# Patient Record
Sex: MALE | Race: WHITE | NOT HISPANIC OR LATINO | Employment: OTHER | URBAN - NONMETROPOLITAN AREA
[De-identification: names, ages, dates, MRNs, and addresses within clinical notes are randomized per-mention and may not be internally consistent; named-entity substitution may affect disease eponyms.]

---

## 2017-10-30 ENCOUNTER — HOSPITAL ENCOUNTER (OUTPATIENT)
Dept: GENERAL RADIOLOGY | Facility: HOSPITAL | Age: 52
Discharge: HOME | End: 2017-10-30
Attending: INTERNAL MEDICINE | Admitting: INTERNAL MEDICINE

## 2017-10-30 ENCOUNTER — TRANSCRIBE ORDERS (OUTPATIENT)
Dept: GENERAL RADIOLOGY | Facility: HOSPITAL | Age: 52
End: 2017-10-30

## 2017-10-30 DIAGNOSIS — M54.2 CERVICALGIA: ICD-10-CM

## 2017-10-30 DIAGNOSIS — M54.2 CERVICALGIA: Primary | ICD-10-CM

## 2017-10-30 PROCEDURE — 72050 X-RAY EXAM NECK SPINE 4/5VWS: CPT

## 2017-10-30 PROCEDURE — 72052 X-RAY EXAM NECK SPINE 6/>VWS: CPT | Performed by: RADIOLOGY

## 2017-11-20 ENCOUNTER — TRANSCRIBE ORDERS (OUTPATIENT)
Dept: ADMINISTRATIVE | Facility: HOSPITAL | Age: 52
End: 2017-11-20

## 2017-11-20 DIAGNOSIS — M50.90 DISC DISORDER OF CERVICAL REGION: Primary | ICD-10-CM

## 2017-12-04 ENCOUNTER — HOSPITAL ENCOUNTER (OUTPATIENT)
Dept: MRI IMAGING | Facility: HOSPITAL | Age: 52
Discharge: HOME | End: 2017-12-04
Attending: INTERNAL MEDICINE | Admitting: INTERNAL MEDICINE

## 2017-12-04 DIAGNOSIS — M50.90 DISC DISORDER OF CERVICAL REGION: ICD-10-CM

## 2017-12-04 PROCEDURE — 72141 MRI NECK SPINE W/O DYE: CPT

## 2017-12-04 PROCEDURE — 72141 MRI NECK SPINE W/O DYE: CPT | Performed by: RADIOLOGY

## 2018-12-11 ENCOUNTER — TRANSCRIBE ORDERS (OUTPATIENT)
Dept: ADMINISTRATIVE | Facility: HOSPITAL | Age: 53
End: 2018-12-11

## 2018-12-11 DIAGNOSIS — M50.20 DISPLACEMENT OF CERVICAL INTERVERTEBRAL DISC WITHOUT MYELOPATHY: Primary | ICD-10-CM

## 2018-12-17 ENCOUNTER — HOSPITAL ENCOUNTER (OUTPATIENT)
Dept: GENERAL RADIOLOGY | Facility: HOSPITAL | Age: 53
Discharge: HOME | End: 2018-12-17
Attending: INTERNAL MEDICINE

## 2018-12-17 ENCOUNTER — TRANSCRIBE ORDERS (OUTPATIENT)
Dept: ADMINISTRATIVE | Facility: HOSPITAL | Age: 53
End: 2018-12-17

## 2018-12-17 ENCOUNTER — HOSPITAL ENCOUNTER (OUTPATIENT)
Dept: MRI IMAGING | Facility: HOSPITAL | Age: 53
Discharge: HOME | End: 2018-12-17
Attending: INTERNAL MEDICINE | Admitting: INTERNAL MEDICINE

## 2018-12-17 DIAGNOSIS — M50.20 DISPLACEMENT OF CERVICAL INTERVERTEBRAL DISC WITHOUT MYELOPATHY: ICD-10-CM

## 2018-12-17 DIAGNOSIS — M54.9 BACK PAIN, UNSPECIFIED BACK LOCATION, UNSPECIFIED BACK PAIN LATERALITY, UNSPECIFIED CHRONICITY: ICD-10-CM

## 2018-12-17 DIAGNOSIS — M54.9 BACK PAIN, UNSPECIFIED BACK LOCATION, UNSPECIFIED BACK PAIN LATERALITY, UNSPECIFIED CHRONICITY: Primary | ICD-10-CM

## 2018-12-17 PROCEDURE — 72072 X-RAY EXAM THORAC SPINE 3VWS: CPT | Performed by: RADIOLOGY

## 2018-12-17 PROCEDURE — 72072 X-RAY EXAM THORAC SPINE 3VWS: CPT

## 2018-12-17 PROCEDURE — 72141 MRI NECK SPINE W/O DYE: CPT

## 2018-12-17 PROCEDURE — 72141 MRI NECK SPINE W/O DYE: CPT | Performed by: RADIOLOGY

## 2019-11-06 ENCOUNTER — TRANSCRIBE ORDERS (OUTPATIENT)
Dept: ADMINISTRATIVE | Facility: HOSPITAL | Age: 54
End: 2019-11-06

## 2019-11-06 DIAGNOSIS — R07.89 OTHER CHEST PAIN: Primary | ICD-10-CM

## 2019-11-11 ENCOUNTER — HOSPITAL ENCOUNTER (OUTPATIENT)
Dept: GENERAL RADIOLOGY | Facility: HOSPITAL | Age: 54
Discharge: HOME | End: 2019-11-11
Admitting: INTERNAL MEDICINE

## 2019-11-11 ENCOUNTER — TRANSCRIBE ORDERS (OUTPATIENT)
Dept: OTHER | Facility: OTHER | Age: 54
End: 2019-11-11

## 2019-11-11 DIAGNOSIS — R06.02 SHORTNESS OF BREATH: Primary | ICD-10-CM

## 2019-11-11 DIAGNOSIS — R06.02 SHORTNESS OF BREATH: ICD-10-CM

## 2019-11-11 PROCEDURE — 71046 X-RAY EXAM CHEST 2 VIEWS: CPT

## 2019-11-11 PROCEDURE — 71046 X-RAY EXAM CHEST 2 VIEWS: CPT | Performed by: RADIOLOGY

## 2019-11-18 ENCOUNTER — HOSPITAL ENCOUNTER (OUTPATIENT)
Dept: NUCLEAR MEDICINE | Facility: HOSPITAL | Age: 54
Discharge: HOME | End: 2019-11-18

## 2019-11-18 ENCOUNTER — HOSPITAL ENCOUNTER (OUTPATIENT)
Dept: CARDIOLOGY | Facility: HOSPITAL | Age: 54
Discharge: HOME | End: 2019-11-18

## 2019-11-18 DIAGNOSIS — R07.89 OTHER CHEST PAIN: ICD-10-CM

## 2019-11-18 PROCEDURE — A9500 TC99M SESTAMIBI: HCPCS | Performed by: INTERNAL MEDICINE

## 2019-11-18 PROCEDURE — 78452 HT MUSCLE IMAGE SPECT MULT: CPT

## 2019-11-18 PROCEDURE — 0 TECHNETIUM SESTAMIBI: Performed by: INTERNAL MEDICINE

## 2019-11-18 PROCEDURE — 93017 CV STRESS TEST TRACING ONLY: CPT

## 2019-11-18 RX ADMIN — TECHNETIUM TC 99M SESTAMIBI 1 DOSE: 1 INJECTION INTRAVENOUS at 12:00

## 2019-11-18 RX ADMIN — TECHNETIUM TC 99M SESTAMIBI 1 DOSE: 1 INJECTION INTRAVENOUS at 13:26

## 2019-11-20 LAB
BH CV NUCLEAR PRIOR STUDY: 3
BH CV STRESS BP STAGE 1: NORMAL
BH CV STRESS BP STAGE 2: NORMAL
BH CV STRESS BP STAGE 3: NORMAL
BH CV STRESS BP STAGE 4: NORMAL
BH CV STRESS DURATION MIN STAGE 1: 3
BH CV STRESS DURATION MIN STAGE 2: 3
BH CV STRESS DURATION MIN STAGE 3: 3
BH CV STRESS DURATION MIN STAGE 4: 3
BH CV STRESS DURATION SEC STAGE 1: 0
BH CV STRESS DURATION SEC STAGE 2: 0
BH CV STRESS DURATION SEC STAGE 3: 0
BH CV STRESS DURATION SEC STAGE 4: 0
BH CV STRESS GRADE STAGE 1: 10
BH CV STRESS GRADE STAGE 2: 12
BH CV STRESS GRADE STAGE 3: 14
BH CV STRESS GRADE STAGE 4: 16
BH CV STRESS HR STAGE 1: 98
BH CV STRESS HR STAGE 2: 114
BH CV STRESS HR STAGE 3: 137
BH CV STRESS HR STAGE 4: 152
BH CV STRESS METS STAGE 1: 5
BH CV STRESS METS STAGE 2: 7.5
BH CV STRESS METS STAGE 3: 10
BH CV STRESS METS STAGE 4: 13.5
BH CV STRESS PROTOCOL 1: NORMAL
BH CV STRESS RECOVERY BP: NORMAL MMHG
BH CV STRESS RECOVERY HR: 94 BPM
BH CV STRESS SPEED STAGE 1: 1.7
BH CV STRESS SPEED STAGE 2: 2.5
BH CV STRESS SPEED STAGE 3: 3.4
BH CV STRESS SPEED STAGE 4: 4.2
BH CV STRESS STAGE 1: 1
BH CV STRESS STAGE 2: 2
BH CV STRESS STAGE 3: 3
BH CV STRESS STAGE 4: 4
LV EF NUC BP: 51 %
MAXIMAL PREDICTED HEART RATE: 166 BPM
PERCENT MAX PREDICTED HR: 91.57 %
STRESS BASELINE BP: NORMAL MMHG
STRESS BASELINE HR: 90 BPM
STRESS PERCENT HR: 108 %
STRESS POST ESTIMATED WORKLOAD: 12.8 METS
STRESS POST EXERCISE DUR MIN: 10 MIN
STRESS POST EXERCISE DUR SEC: 0 SEC
STRESS POST PEAK BP: NORMAL MMHG
STRESS POST PEAK HR: 152 BPM
STRESS TARGET HR: 141 BPM

## 2019-12-05 ENCOUNTER — TRANSCRIBE ORDERS (OUTPATIENT)
Dept: ADMINISTRATIVE | Facility: HOSPITAL | Age: 54
End: 2019-12-05

## 2019-12-05 DIAGNOSIS — R06.02 SHORTNESS OF BREATH: Primary | ICD-10-CM

## 2019-12-16 ENCOUNTER — APPOINTMENT (OUTPATIENT)
Dept: RESPIRATORY THERAPY | Facility: HOSPITAL | Age: 54
End: 2019-12-16

## 2020-05-11 ENCOUNTER — TRANSCRIBE ORDERS (OUTPATIENT)
Dept: ADMINISTRATIVE | Facility: HOSPITAL | Age: 55
End: 2020-05-11

## 2020-05-11 ENCOUNTER — HOSPITAL ENCOUNTER (OUTPATIENT)
Dept: GENERAL RADIOLOGY | Facility: HOSPITAL | Age: 55
Discharge: HOME | End: 2020-05-11

## 2020-05-11 ENCOUNTER — APPOINTMENT (OUTPATIENT)
Dept: GENERAL RADIOLOGY | Facility: HOSPITAL | Age: 55
End: 2020-05-11

## 2020-05-11 ENCOUNTER — HOSPITAL ENCOUNTER (OUTPATIENT)
Dept: GENERAL RADIOLOGY | Facility: HOSPITAL | Age: 55
Discharge: HOME | End: 2020-05-11
Admitting: INTERNAL MEDICINE

## 2020-05-11 DIAGNOSIS — R07.81 RIB PAIN: Primary | ICD-10-CM

## 2020-05-11 DIAGNOSIS — M54.14 THORACIC NEURITIS: ICD-10-CM

## 2020-05-11 DIAGNOSIS — R07.81 PLEURITIC CHEST PAIN: ICD-10-CM

## 2020-05-11 DIAGNOSIS — M54.14 THORACIC NEURITIS: Primary | ICD-10-CM

## 2020-05-11 PROCEDURE — 71111 X-RAY EXAM RIBS/CHEST4/> VWS: CPT | Performed by: RADIOLOGY

## 2020-05-11 PROCEDURE — 73030 X-RAY EXAM OF SHOULDER: CPT

## 2020-05-11 PROCEDURE — 72072 X-RAY EXAM THORAC SPINE 3VWS: CPT

## 2020-05-11 PROCEDURE — 72072 X-RAY EXAM THORAC SPINE 3VWS: CPT | Performed by: RADIOLOGY

## 2020-05-11 PROCEDURE — 73030 X-RAY EXAM OF SHOULDER: CPT | Performed by: RADIOLOGY

## 2020-05-11 PROCEDURE — 71110 X-RAY EXAM RIBS BIL 3 VIEWS: CPT

## 2020-05-12 ENCOUNTER — TRANSCRIBE ORDERS (OUTPATIENT)
Dept: ADMINISTRATIVE | Facility: HOSPITAL | Age: 55
End: 2020-05-12

## 2020-05-12 DIAGNOSIS — M75.101 TEAR OF RIGHT ROTATOR CUFF, UNSPECIFIED TEAR EXTENT, UNSPECIFIED WHETHER TRAUMATIC: Primary | ICD-10-CM

## 2020-05-12 DIAGNOSIS — M89.8X9 BONE PAIN: ICD-10-CM

## 2020-05-22 ENCOUNTER — APPOINTMENT (OUTPATIENT)
Dept: NUCLEAR MEDICINE | Facility: HOSPITAL | Age: 55
End: 2020-05-22

## 2020-05-22 ENCOUNTER — HOSPITAL ENCOUNTER (OUTPATIENT)
Dept: MRI IMAGING | Facility: HOSPITAL | Age: 55
Discharge: HOME | End: 2020-05-22

## 2020-05-22 DIAGNOSIS — M75.101 TEAR OF RIGHT ROTATOR CUFF, UNSPECIFIED TEAR EXTENT, UNSPECIFIED WHETHER TRAUMATIC: ICD-10-CM

## 2020-05-22 PROCEDURE — 73221 MRI JOINT UPR EXTREM W/O DYE: CPT

## 2020-05-22 PROCEDURE — A9577 INJ MULTIHANCE: HCPCS

## 2020-05-22 PROCEDURE — 73221 MRI JOINT UPR EXTREM W/O DYE: CPT | Performed by: RADIOLOGY

## 2020-05-22 PROCEDURE — 0 GADOBENATE DIMEGLUMINE 529 MG/ML SOLUTION

## 2020-11-30 ENCOUNTER — APPOINTMENT (OUTPATIENT)
Dept: NUCLEAR MEDICINE | Facility: HOSPITAL | Age: 55
End: 2020-11-30

## 2020-11-30 ENCOUNTER — APPOINTMENT (OUTPATIENT)
Dept: BONE DENSITY | Facility: HOSPITAL | Age: 55
End: 2020-11-30

## 2020-12-07 ENCOUNTER — HOSPITAL ENCOUNTER (OUTPATIENT)
Dept: BONE DENSITY | Facility: HOSPITAL | Age: 55
Discharge: HOME | End: 2020-12-07
Admitting: INTERNAL MEDICINE

## 2020-12-07 DIAGNOSIS — M81.0 AGE-RELATED OSTEOPOROSIS WITHOUT CURRENT PATHOLOGICAL FRACTURE: ICD-10-CM

## 2020-12-07 PROCEDURE — 77080 DXA BONE DENSITY AXIAL: CPT | Performed by: RADIOLOGY

## 2020-12-07 PROCEDURE — 77080 DXA BONE DENSITY AXIAL: CPT

## 2021-03-01 ENCOUNTER — IMMUNIZATION (OUTPATIENT)
Dept: VACCINE CLINIC | Facility: HOSPITAL | Age: 56
End: 2021-03-01

## 2021-03-01 PROCEDURE — 91300 HC SARSCOV02 VAC 30MCG/0.3ML IM: CPT | Performed by: INTERNAL MEDICINE

## 2021-03-01 PROCEDURE — 0001A: CPT | Performed by: INTERNAL MEDICINE

## 2021-03-22 ENCOUNTER — IMMUNIZATION (OUTPATIENT)
Dept: VACCINE CLINIC | Facility: HOSPITAL | Age: 56
End: 2021-03-22

## 2021-03-22 PROCEDURE — 91300 HC SARSCOV02 VAC 30MCG/0.3ML IM: CPT | Performed by: INTERNAL MEDICINE

## 2021-03-22 PROCEDURE — 0002A: CPT | Performed by: INTERNAL MEDICINE

## 2021-07-09 ENCOUNTER — TRANSCRIBE ORDERS (OUTPATIENT)
Dept: ADMINISTRATIVE | Facility: HOSPITAL | Age: 56
End: 2021-07-09

## 2021-07-09 ENCOUNTER — HOSPITAL ENCOUNTER (OUTPATIENT)
Dept: GENERAL RADIOLOGY | Facility: HOSPITAL | Age: 56
Discharge: HOME | End: 2021-07-09
Admitting: INTERNAL MEDICINE

## 2021-07-09 DIAGNOSIS — S46.912A STRAIN OF SHOULDER, LEFT, INITIAL ENCOUNTER: ICD-10-CM

## 2021-07-09 DIAGNOSIS — S46.912A STRAIN OF SHOULDER, LEFT, INITIAL ENCOUNTER: Primary | ICD-10-CM

## 2021-07-09 PROCEDURE — 73030 X-RAY EXAM OF SHOULDER: CPT

## 2021-07-09 PROCEDURE — 73030 X-RAY EXAM OF SHOULDER: CPT | Performed by: RADIOLOGY

## 2021-07-14 ENCOUNTER — TRANSCRIBE ORDERS (OUTPATIENT)
Dept: ADMINISTRATIVE | Facility: HOSPITAL | Age: 56
End: 2021-07-14

## 2021-07-14 DIAGNOSIS — M25.512 LEFT SHOULDER PAIN, UNSPECIFIED CHRONICITY: Primary | ICD-10-CM

## 2021-08-02 ENCOUNTER — HOSPITAL ENCOUNTER (OUTPATIENT)
Dept: MRI IMAGING | Facility: HOSPITAL | Age: 56
Discharge: HOME | End: 2021-08-02
Admitting: INTERNAL MEDICINE

## 2021-08-02 DIAGNOSIS — M25.512 LEFT SHOULDER PAIN, UNSPECIFIED CHRONICITY: ICD-10-CM

## 2021-08-02 PROCEDURE — 73221 MRI JOINT UPR EXTREM W/O DYE: CPT | Performed by: RADIOLOGY

## 2021-08-02 PROCEDURE — 73221 MRI JOINT UPR EXTREM W/O DYE: CPT

## 2021-08-03 ENCOUNTER — HOSPITAL ENCOUNTER (OUTPATIENT)
Dept: MRI IMAGING | Facility: HOSPITAL | Age: 56
Discharge: HOME | End: 2021-08-03
Admitting: INTERNAL MEDICINE

## 2021-08-03 ENCOUNTER — TRANSCRIBE ORDERS (OUTPATIENT)
Dept: ADMINISTRATIVE | Facility: HOSPITAL | Age: 56
End: 2021-08-03

## 2021-08-03 DIAGNOSIS — M50.90 DISC DISORDER OF CERVICAL REGION: ICD-10-CM

## 2021-08-03 DIAGNOSIS — M50.20 DISPLACEMENT OF CERVICAL INTERVERTEBRAL DISC WITHOUT MYELOPATHY: ICD-10-CM

## 2021-08-03 DIAGNOSIS — S43.431A TEAR OF RIGHT GLENOID LABRUM, INITIAL ENCOUNTER: Primary | ICD-10-CM

## 2021-08-03 DIAGNOSIS — S43.431A TEAR OF RIGHT GLENOID LABRUM, INITIAL ENCOUNTER: ICD-10-CM

## 2021-08-03 PROCEDURE — 72141 MRI NECK SPINE W/O DYE: CPT | Performed by: RADIOLOGY

## 2021-08-03 PROCEDURE — 72141 MRI NECK SPINE W/O DYE: CPT

## 2021-09-02 ENCOUNTER — OFFICE VISIT (OUTPATIENT)
Dept: NEUROSURGERY | Facility: CLINIC | Age: 56
End: 2021-09-02

## 2021-09-02 VITALS
BODY MASS INDEX: 26.48 KG/M2 | TEMPERATURE: 98.2 F | WEIGHT: 199.8 LBS | HEIGHT: 73 IN | OXYGEN SATURATION: 100 % | HEART RATE: 77 BPM

## 2021-09-02 DIAGNOSIS — M50.20 RUPTURED DISC, CERVICAL: ICD-10-CM

## 2021-09-02 DIAGNOSIS — M47.812 ARTHRITIS OF NECK: Primary | ICD-10-CM

## 2021-09-02 PROCEDURE — 99204 OFFICE O/P NEW MOD 45 MIN: CPT | Performed by: NEUROLOGICAL SURGERY

## 2021-09-02 RX ORDER — LORAZEPAM 1 MG/1
1 TABLET ORAL EVERY 12 HOURS PRN
COMMUNITY
Start: 2021-08-28

## 2021-09-02 RX ORDER — LOPERAMIDE HYDROCHLORIDE 2 MG/1
2 CAPSULE ORAL AS NEEDED
COMMUNITY
Start: 2021-08-07

## 2021-09-02 RX ORDER — GABAPENTIN 100 MG/1
100 CAPSULE ORAL 2 TIMES DAILY
COMMUNITY
Start: 2021-08-07 | End: 2021-11-01

## 2021-09-02 RX ORDER — IBUPROFEN 800 MG/1
800 TABLET ORAL 2 TIMES DAILY
COMMUNITY
Start: 2021-08-28 | End: 2021-10-05 | Stop reason: ALTCHOICE

## 2021-09-02 RX ORDER — TADALAFIL 5 MG/1
2.5 TABLET ORAL DAILY
COMMUNITY
Start: 2021-08-28

## 2021-09-02 RX ORDER — TESTOSTERONE 16.2 MG/G
GEL TRANSDERMAL
COMMUNITY
Start: 2021-08-20

## 2021-09-02 RX ORDER — PREGABALIN 75 MG/1
75 CAPSULE ORAL 2 TIMES DAILY
Qty: 60 CAPSULE | Refills: 0 | Status: SHIPPED | OUTPATIENT
Start: 2021-09-02 | End: 2021-10-05

## 2021-09-02 RX ORDER — DIAZEPAM 5 MG/1
TABLET ORAL
Qty: 2 TABLET | Refills: 0 | Status: SHIPPED | OUTPATIENT
Start: 2021-09-02 | End: 2021-11-01

## 2021-09-02 RX ORDER — DOLUTEGRAVIR SODIUM AND RILPIVIRINE HYDROCHLORIDE 50; 25 MG/1; MG/1
1 TABLET, FILM COATED ORAL DAILY
COMMUNITY
Start: 2021-08-09

## 2021-09-02 RX ORDER — TESTOSTERONE CYPIONATE 200 MG/ML
200 INJECTION, SOLUTION INTRAMUSCULAR
COMMUNITY
Start: 2021-08-20

## 2021-09-02 RX ORDER — TRAMADOL HYDROCHLORIDE 50 MG/1
50 TABLET ORAL 2 TIMES DAILY
COMMUNITY
Start: 2021-08-28

## 2021-09-02 NOTE — PROGRESS NOTES
NAME: GRISELDA KNOTT   DOS: 2021  : 1965  PCP: Lorin Deras MD    Chief Complaint:    Chief Complaint   Patient presents with   • Neck Pain   • Left shoulder pain       History of Present Illness:  56 y.o. male   I saw this 56-year-old male in neurosurgical consultation he is a hairdresser from the [employer] and has a chronic history of axial neck pain    Most recently has had some left-sided shoulder and parascapular pain consistent with a C7 radiculopathy.  He denies any weakness of his upper and lower extremities and is here for evaluation he has not been to a spine surgeon for he has not done therapy but does go to a chiropractor he is on gabapentin ibuprofen and is a smoker    PMHX  Allergies:  Allergies   Allergen Reactions   • Codeine Shortness Of Breath     Medications    Current Outpatient Medications:   •  AndroGel Pump 20.25 MG/ACT (1.62%) gel, , Disp: , Rfl:   •  gabapentin (NEURONTIN) 100 MG capsule, Take 100 mg by mouth 2 (two) times a day., Disp: , Rfl:   •  ibuprofen (ADVIL,MOTRIN) 800 MG tablet, Take 800 mg by mouth 2 (two) times a day., Disp: , Rfl:   •  Juluca 50-25 MG per tablet, , Disp: , Rfl:   •  loperamide (IMODIUM) 2 MG capsule, Take 2 mg by mouth As Needed., Disp: , Rfl:   •  LORazepam (ATIVAN) 1 MG tablet, Take 1 mg by mouth Every 12 (Twelve) Hours As Needed., Disp: , Rfl:   •  tadalafil (CIALIS) 5 MG tablet, Take 5 mg by mouth Daily., Disp: , Rfl:   •  Testosterone Cypionate (DEPOTESTOTERONE CYPIONATE) 200 MG/ML injection, 2 TIMES  PER MONTH, Disp: , Rfl:   •  traMADol (ULTRAM) 50 MG tablet, Take 50 mg by mouth 2 (two) times a day. for pain, Disp: , Rfl:   Past Medical History:  Past Medical History:   Diagnosis Date   • AIDS (acquired immune deficiency syndrome) (CMS/HCC)    • HIV (human immunodeficiency virus infection) (CMS/HCC)      Past Surgical History:  Past Surgical History:   Procedure Laterality Date   • APPENDECTOMY     • GALLBLADDER SURGERY       Social  Hx:  Social History     Tobacco Use   • Smoking status: Current Every Day Smoker     Packs/day: 0.50     Years: 10.00     Pack years: 5.00     Types: Cigarettes   • Smokeless tobacco: Never Used   Vaping Use   • Vaping Use: Never used   Substance Use Topics   • Alcohol use: Yes     Comment: 2 drinks per week   • Drug use: Never     Family Hx:  Family History   Problem Relation Age of Onset   • Heart disease Mother    • Diabetes Sister    • Hypertension Sister      Review of Systems:        Review of Systems   Constitutional: Positive for fatigue. Negative for activity change, appetite change, chills, diaphoresis, fever and unexpected weight change.   HENT: Positive for sinus pressure. Negative for congestion, dental problem, drooling, ear discharge, ear pain, facial swelling, hearing loss, mouth sores, nosebleeds, postnasal drip, rhinorrhea, sinus pain, sneezing, sore throat, tinnitus, trouble swallowing and voice change.    Eyes: Negative for photophobia, pain, discharge, redness, itching and visual disturbance.   Respiratory: Negative for apnea, cough, choking, chest tightness, shortness of breath, wheezing and stridor.    Cardiovascular: Negative for chest pain, palpitations and leg swelling.   Gastrointestinal: Positive for diarrhea. Negative for abdominal distention, abdominal pain, anal bleeding, blood in stool, constipation, nausea, rectal pain and vomiting.   Endocrine: Negative for cold intolerance, heat intolerance, polydipsia, polyphagia and polyuria.   Genitourinary: Negative for decreased urine volume, difficulty urinating, dysuria, enuresis, flank pain, frequency, genital sores, hematuria and urgency.   Musculoskeletal: Positive for neck pain and neck stiffness. Negative for arthralgias, back pain, gait problem, joint swelling and myalgias.   Skin: Negative for color change, pallor, rash and wound.   Allergic/Immunologic: Positive for environmental allergies and food allergies. Negative for  immunocompromised state.   Neurological: Negative for dizziness, tremors, seizures, syncope, facial asymmetry, speech difficulty, weakness, light-headedness, numbness and headaches.   Hematological: Negative for adenopathy. Does not bruise/bleed easily.   Psychiatric/Behavioral: Positive for agitation and decreased concentration. Negative for behavioral problems, confusion, dysphoric mood, hallucinations, self-injury, sleep disturbance and suicidal ideas. The patient is not nervous/anxious and is not hyperactive.    All other systems reviewed and are negative.     I have reviewed this note template and all pertinent parts of the review of systems social, family history, surgical history and medication list    Significant for some questionable gait instability      Physical Examination:  Vitals:    09/02/21 1334   Pulse: 77   Temp: 98.2 °F (36.8 °C)   SpO2: 100%      General Appearance:   Well developed, well nourished, well groomed, alert, and cooperative.  Neurological examination:  Neurologic Exam  Vital signs were reviewed and documented in the chart  Patient appeared in good neurologic function with normal comprehension fluent speech  Mood and affect are normal  Sense of smell deferred  Covid mask left in place  Muscle bulk and tone normal  5 out of 5 strength no motor drift  Gait normal intact, but it is wide-based  Negative Romberg  No clonus long tract signs or myelopathy  He has a significant amount of guarding with that left arm secondary to pain  Reflexes symmetric some evidence of mild hyperreflexia at the knees  No edema noted and extremities skin appears normal    Straight leg raise sign absent  No signs of intrinsic hip dysfunction  Back is without any lesions or abnormality  Feet are warm and well perfused        Review of Imaging/DATA:  I reviewed 3 MRIs  by a couple years apiece the most recent MRI has a ton of motion artifact is difficult to ascertain but I suspect there is a left C6-7  disc herniation he has central canal stenosis its concerning I reviewed and interpreted these films personally  Diagnoses/Plan:    Mr. Bueno is a 56 y.o. male   This a 56-year-old gentleman with a complex medical history he has a history of cervical degenerative disease at C3-4, C4-5 C5-6 and C6-7 he likely has a new left-sided C6-7 disc herniation resultant C7 radiculopathy however the quality the MRI is not good enough to be able to ascertain that I written him for  Voltaren 75 twice daily  We will change his gabapentin to Lyrica 75 twice daily  He can take Tylenol as well as this    We will get a repeat MRI cervical spine with a CT scan to check for calcifications    I explained the risk benefits and expected outcome of major elective surgery for their problem, complications from approach, and infection, the risk of neurologic implications after surgery as well as need for repeat surgeries and most importantly failure to achieve quality of life improvement from the surgery to the patient.    We will have to talk to him about smoking of his ongoing smoker he has a past history that    I explained the complexities of his neck the risk of cervical myelopathy complications from major elective surgery especially in the context of cord compression and he probably has mild mild cervical myelopathy    We will see him back sooner he is in require sedation for his MRI

## 2021-09-03 ENCOUNTER — PATIENT ROUNDING (BHMG ONLY) (OUTPATIENT)
Dept: NEUROSURGERY | Facility: CLINIC | Age: 56
End: 2021-09-03

## 2021-09-03 NOTE — PROGRESS NOTES
September 3, 2021    Hello, may I speak with Jag Bueno?    My name is Callie Burton.    I am  with [facility]  [facility]  [address]  [facility] [place] [facility] [address] [phone].    Before we get started may I verify your date of birth? 1965    I am calling to officially welcome you to our practice and ask about your recent visit. Is this a good time to talk? Yes      Tell me about your visit with us. What things went well?  He expressed that he felt the visit went very well and that Dr Murphy was very pleasant.       We're always looking for ways to make our patients' experiences even better. Do you have recommendations on ways we may improve? no    Overall were you satisfied with your first visit to our practice? yes       I appreciate you taking the time to speak with me today. Is there anything else I can do for you? No    I told the patient to give us a call if he had and questions or concerns prior to his follow-up visit on 10-4-21 with .      Thank you, and have a great day.

## 2021-09-10 ENCOUNTER — HOSPITAL ENCOUNTER (OUTPATIENT)
Dept: MRI IMAGING | Facility: HOSPITAL | Age: 56
Discharge: HOME | End: 2021-09-10
Admitting: NEUROLOGICAL SURGERY

## 2021-09-10 DIAGNOSIS — M50.20 RUPTURED DISC, CERVICAL: ICD-10-CM

## 2021-09-10 DIAGNOSIS — M47.812 ARTHRITIS OF NECK: ICD-10-CM

## 2021-09-10 PROCEDURE — A9577 INJ MULTIHANCE: HCPCS | Performed by: NEUROLOGICAL SURGERY

## 2021-09-10 PROCEDURE — 72156 MRI NECK SPINE W/O & W/DYE: CPT

## 2021-09-10 PROCEDURE — 0 GADOBENATE DIMEGLUMINE 529 MG/ML SOLUTION: Performed by: NEUROLOGICAL SURGERY

## 2021-09-10 RX ADMIN — GADOBENATE DIMEGLUMINE 18 ML: 529 INJECTION, SOLUTION INTRAVENOUS at 14:13

## 2021-09-15 ENCOUNTER — HOSPITAL ENCOUNTER (OUTPATIENT)
Dept: CT IMAGING | Facility: HOSPITAL | Age: 56
Discharge: HOME | End: 2021-09-15
Admitting: NEUROLOGICAL SURGERY

## 2021-09-15 DIAGNOSIS — M50.20 RUPTURED DISC, CERVICAL: ICD-10-CM

## 2021-09-15 DIAGNOSIS — M47.812 ARTHRITIS OF NECK: ICD-10-CM

## 2021-09-15 PROCEDURE — 72125 CT NECK SPINE W/O DYE: CPT

## 2021-09-15 PROCEDURE — 72125 CT NECK SPINE W/O DYE: CPT | Performed by: RADIOLOGY

## 2021-10-04 ENCOUNTER — OFFICE VISIT (OUTPATIENT)
Dept: NEUROSURGERY | Facility: CLINIC | Age: 56
End: 2021-10-04

## 2021-10-04 ENCOUNTER — PREP FOR SURGERY (OUTPATIENT)
Dept: OTHER | Facility: HOSPITAL | Age: 56
End: 2021-10-04

## 2021-10-04 DIAGNOSIS — M50.90 DISC DISORDER OF CERVICAL REGION: Primary | ICD-10-CM

## 2021-10-04 DIAGNOSIS — M50.20 RUPTURED DISC, CERVICAL: Primary | ICD-10-CM

## 2021-10-04 DIAGNOSIS — M47.812 ARTHRITIS OF NECK: ICD-10-CM

## 2021-10-04 PROCEDURE — 99214 OFFICE O/P EST MOD 30 MIN: CPT | Performed by: NEUROLOGICAL SURGERY

## 2021-10-04 RX ORDER — IBUPROFEN 800 MG/1
800 TABLET ORAL ONCE
Status: CANCELLED | OUTPATIENT
Start: 2021-10-04 | End: 2021-10-04

## 2021-10-04 RX ORDER — SODIUM CHLORIDE, SODIUM LACTATE, POTASSIUM CHLORIDE, CALCIUM CHLORIDE 600; 310; 30; 20 MG/100ML; MG/100ML; MG/100ML; MG/100ML
9 INJECTION, SOLUTION INTRAVENOUS CONTINUOUS
Status: CANCELLED | OUTPATIENT
Start: 2021-10-04

## 2021-10-04 RX ORDER — SODIUM CHLORIDE 0.9 % (FLUSH) 0.9 %
10 SYRINGE (ML) INJECTION EVERY 12 HOURS SCHEDULED
Status: CANCELLED | OUTPATIENT
Start: 2021-10-04

## 2021-10-04 RX ORDER — FAMOTIDINE 20 MG/1
20 TABLET, FILM COATED ORAL
Status: CANCELLED | OUTPATIENT
Start: 2021-10-04

## 2021-10-04 RX ORDER — ACETAMINOPHEN 325 MG/1
650 TABLET ORAL ONCE
Status: CANCELLED | OUTPATIENT
Start: 2021-10-04 | End: 2021-10-04

## 2021-10-04 RX ORDER — CHLORHEXIDINE GLUCONATE 4 G/100ML
SOLUTION TOPICAL
Qty: 120 ML | Refills: 0 | Status: SHIPPED | OUTPATIENT
Start: 2021-10-04 | End: 2021-11-03 | Stop reason: HOSPADM

## 2021-10-04 RX ORDER — HYDROCODONE BITARTRATE AND ACETAMINOPHEN 7.5; 325 MG/1; MG/1
1 TABLET ORAL ONCE
Status: CANCELLED | OUTPATIENT
Start: 2021-10-04 | End: 2021-10-04

## 2021-10-04 RX ORDER — CEFAZOLIN SODIUM 2 G/100ML
2 INJECTION, SOLUTION INTRAVENOUS ONCE
Status: CANCELLED | OUTPATIENT
Start: 2021-10-04 | End: 2021-10-04

## 2021-10-04 RX ORDER — SODIUM CHLORIDE 0.9 % (FLUSH) 0.9 %
1-10 SYRINGE (ML) INJECTION AS NEEDED
Status: CANCELLED | OUTPATIENT
Start: 2021-10-04

## 2021-10-04 NOTE — PROGRESS NOTES
"  NAME: GRISELDA KNOTT   DOS: 10/4/2021  : 1965  PCP: Lorin Deras MD    Chief Complaint:    Chief Complaint   Patient presents with   • Neck & Left Shoulder Pain       History of Present Illness:  56 y.o. male   Saw this 56-year-old male in neurosurgical follow-up I saw him about a month ago for the presence of neck and left upper extremity pain we tried him on some Lyrica he is a smoker.  He had an unintelligible MRI here for follow-up    Denies any other significant symptoms he still having daily medial scapula pain with radiation down the arm he is \"better \"but is reliant on medicines        PMHX  Allergies:  Allergies   Allergen Reactions   • Codeine Shortness Of Breath     Medications    Current Outpatient Medications:   •  AndroGel Pump 20.25 MG/ACT (1.62%) gel, , Disp: , Rfl:   •  diazePAM (VALIUM) 5 MG tablet, Take one tablet by mouth 1 hour prior to MRI, Disp: 2 tablet, Rfl: 0  •  diclofenac (VOLTAREN) 50 MG EC tablet, Take 1 tablet by mouth 2 (Two) Times a Day., Disp: 60 tablet, Rfl: 0  •  gabapentin (NEURONTIN) 100 MG capsule, Take 100 mg by mouth 2 (two) times a day., Disp: , Rfl:   •  ibuprofen (ADVIL,MOTRIN) 800 MG tablet, Take 800 mg by mouth 2 (two) times a day., Disp: , Rfl:   •  Juluca 50-25 MG per tablet, , Disp: , Rfl:   •  loperamide (IMODIUM) 2 MG capsule, Take 2 mg by mouth As Needed., Disp: , Rfl:   •  LORazepam (ATIVAN) 1 MG tablet, Take 1 mg by mouth Every 12 (Twelve) Hours As Needed., Disp: , Rfl:   •  pregabalin (LYRICA) 75 MG capsule, Take 1 capsule by mouth 2 (Two) Times a Day., Disp: 60 capsule, Rfl: 0  •  tadalafil (CIALIS) 5 MG tablet, Take 5 mg by mouth Daily., Disp: , Rfl:   •  Testosterone Cypionate (DEPOTESTOTERONE CYPIONATE) 200 MG/ML injection, 2 TIMES  PER MONTH, Disp: , Rfl:   •  traMADol (ULTRAM) 50 MG tablet, Take 50 mg by mouth 2 (two) times a day. for pain, Disp: , Rfl:   Past Medical History:  Past Medical History:   Diagnosis Date   • AIDS (acquired " immune deficiency syndrome) ([facility])    • HIV (human immunodeficiency virus infection) ([facility])      Past Surgical History:  Past Surgical History:   Procedure Laterality Date   • APPENDECTOMY     • GALLBLADDER SURGERY       Social Hx:  Social History     Tobacco Use   • Smoking status: Current Every Day Smoker     Packs/day: 0.50     Years: 10.00     Pack years: 5.00     Types: Cigarettes   • Smokeless tobacco: Never Used   Vaping Use   • Vaping Use: Never used   Substance Use Topics   • Alcohol use: Yes     Comment: 2 drinks per week   • Drug use: Never     Family Hx:  Family History   Problem Relation Age of Onset   • Heart disease Mother    • Diabetes Sister    • Hypertension Sister      Review of Systems:        Review of Systems   Constitutional: Negative for activity change, appetite change, chills, diaphoresis, fatigue, fever and unexpected weight change.   HENT: Positive for ear pain, rhinorrhea and sinus pressure. Negative for congestion, dental problem, drooling, ear discharge, facial swelling, hearing loss, mouth sores, nosebleeds, postnasal drip, sinus pain, sneezing, sore throat, tinnitus, trouble swallowing and voice change.    Eyes: Negative for photophobia, pain, discharge, redness, itching and visual disturbance.   Respiratory: Negative for apnea, cough, choking, chest tightness, shortness of breath, wheezing and stridor.    Cardiovascular: Negative for chest pain, palpitations and leg swelling.   Gastrointestinal: Positive for diarrhea. Negative for abdominal distention, abdominal pain, anal bleeding, blood in stool, constipation, nausea, rectal pain and vomiting.   Endocrine: Negative for cold intolerance, heat intolerance, polydipsia, polyphagia and polyuria.   Genitourinary: Negative for decreased urine volume, difficulty urinating, discharge, dysuria, enuresis, flank pain, frequency, genital sores, hematuria, penile pain, penile swelling, scrotal swelling, testicular pain and urgency.    Musculoskeletal: Positive for neck pain. Negative for arthralgias, back pain, gait problem, joint swelling, myalgias and neck stiffness.   Skin: Negative for color change, pallor, rash and wound.   Allergic/Immunologic: Positive for environmental allergies. Negative for food allergies and immunocompromised state.   Neurological: Negative for dizziness, tremors, seizures, syncope, facial asymmetry, speech difficulty, weakness, light-headedness, numbness and headaches.   Hematological: Negative for adenopathy. Does not bruise/bleed easily.   Psychiatric/Behavioral: Negative for agitation, behavioral problems, confusion, decreased concentration, dysphoric mood, hallucinations, self-injury, sleep disturbance and suicidal ideas. The patient is not nervous/anxious and is not hyperactive.    I have reviewed this note template and all pertinent parts of the review of systems social, family history, surgical history and medication list        Physical Examination:  There were no vitals filed for this visit.   General Appearance:   Well developed, well nourished, well groomed, alert, and cooperative.  Neurological examination:  Neurologic Exam  Vital signs were reviewed and documented in the chart  Patient appeared in good neurologic function with normal comprehension fluent speech  Mood and affect are normal  Sense of smell deferred  Neck is supple    Muscle bulk and tone normal  5 out of 5 strength no motor drift perhaps some subtle weakness in the left finger extensors still better than last time  Gait normal intact  Negative Romberg  No clonus long tract signs or myelopathy    Reflexes symmetric  No myelopathy        Review of Imaging/DATA:  I reviewed an MRI of the cervical spine as well as his repeat scans and interpreted these as well as the CT scan he is got diffuse spondylosis    He is had a large eccentric left disc herniation C6-7 with compression of the lateral aspect of the cord  Diagnoses/Plan:    Mr. Bueno  "is a 56 y.o. male   I had an extensive discussion with him.  He is about 3 months out still has some vague weakness in his left hand is on multiple medications and has daily medial scapula and left arm pain consistent with C7 radiculopathy.  I tried to temper his results about \"magic \"which is what he was searching for.  He has lots of cervical spondylitic disease I suspect an ACDF at C6-7 will alleviate his left arm pain I explained the risk benefits expected outcome from surgery.  I explained the risk benefits and expected outcome of major elective surgery for their problem, complications from approach, and infection, the risk of neurologic implications after surgery as well as need for repeat surgeries and most importantly failure to achieve quality of life improvement from the surgery to the patient.    I explained the importance of smoking cessation to the patient explaining that smoking decreases the efficacy of surgical therapies not to mention the general health risks.  In addition to this when contemplating fusion surgeries smoking decreases fusion rates and leads to poor outcome, and contributes to complication rate.    I think is very reasonable to proceed with an ACDF C6-7 for treatment of LEFT arm pain.  He is comfortable with the plan.  I explained the recovery rate etc.  "

## 2021-10-05 DIAGNOSIS — M50.20 RUPTURED DISC, CERVICAL: Primary | ICD-10-CM

## 2021-10-05 DIAGNOSIS — M47.812 ARTHRITIS OF NECK: ICD-10-CM

## 2021-10-05 RX ORDER — PREGABALIN 75 MG/1
CAPSULE ORAL
Qty: 60 CAPSULE | Refills: 1 | Status: SHIPPED | OUTPATIENT
Start: 2021-10-05

## 2021-10-05 NOTE — TELEPHONE ENCOUNTER
Provider:  Saeid  Caller: patient  Time of call:   12:40  Phone #:    Surgery:  ACDF  Surgery Date:    Last visit:   yesterday  Next visit: surgery    ZULMA:  09/15/2021 Testosterone Cypionate  200MG/ML/9.45MG/ML/0.2ML/  1965 2 28 [facility]  Pharmacy  [facility] 2  09/27/2021 Lorazepam 1MG 1965 50 30 [facility], [facility] 1     08/28/2021 Lorazepam 1MG 1965 50 30 [facility]  08/28/2021 Tramadol Hcl 50MG 1965 90 22 [facility], [facility] 20 1  09/06/2021 Diazepam 5MG 1965 2 2 [facility] [place] [facility] 1  09/06/2021 Pregabalin 75MG 1965 60 30 [facility] [place] [facility], [facility] 1      Reason for call:   Patient requests refill on Lyrica and Voltaren.

## 2021-10-13 ENCOUNTER — TELEPHONE (OUTPATIENT)
Dept: NEUROSURGERY | Facility: CLINIC | Age: 56
End: 2021-10-13

## 2021-10-13 PROBLEM — M50.90 DISC DISORDER OF CERVICAL REGION: Status: ACTIVE | Noted: 2021-10-13

## 2021-10-13 NOTE — TELEPHONE ENCOUNTER
Provider:  Jeffrey  Caller: patient  Time of call:   1:37  Phone #:  [phone]  Surgery:  Cervical discectomy  Surgery Date:  11-3-21  Last visit:  10-4-21   Next visit: MANE MAYA:         Reason for call:  Patient had a few more questions about his up coming surgery.   He wants to know how long it will take and also how it will be done, will he be cutting or will it be Laparoscopic? He is a little nervous. Please Advise. Thank you.

## 2021-10-13 NOTE — TELEPHONE ENCOUNTER
1 inch incision will be made on the right side of his neck.  It typically takes between 45 minutes and a hour

## 2021-11-01 ENCOUNTER — PRE-ADMISSION TESTING (OUTPATIENT)
Dept: PREADMISSION TESTING | Facility: HOSPITAL | Age: 56
End: 2021-11-01

## 2021-11-01 VITALS — BODY MASS INDEX: 27.04 KG/M2 | WEIGHT: 204 LBS | HEIGHT: 73 IN

## 2021-11-01 DIAGNOSIS — M50.90 DISC DISORDER OF CERVICAL REGION: ICD-10-CM

## 2021-11-01 LAB
ANION GAP SERPL CALCULATED.3IONS-SCNC: 10 MMOL/L (ref 5–15)
BUN SERPL-MCNC: 16 MG/DL (ref 6–20)
BUN/CREAT SERPL: 14.7 (ref 7–25)
CALCIUM SPEC-SCNC: 10.3 MG/DL (ref 8.6–10.5)
CHLORIDE SERPL-SCNC: 103 MMOL/L (ref 98–107)
CO2 SERPL-SCNC: 28 MMOL/L (ref 22–29)
CREAT SERPL-MCNC: 1.09 MG/DL (ref 0.76–1.27)
DEPRECATED RDW RBC AUTO: 48.1 FL (ref 37–54)
ERYTHROCYTE [DISTWIDTH] IN BLOOD BY AUTOMATED COUNT: 13.3 % (ref 12.3–15.4)
GFR SERPL CREATININE-BSD FRML MDRD: 70 ML/MIN/1.73
GLUCOSE SERPL-MCNC: 78 MG/DL (ref 65–99)
HBA1C MFR BLD: 5.2 % (ref 4.8–5.6)
HCT VFR BLD AUTO: 51.3 % (ref 37.5–51)
HGB BLD-MCNC: 17 G/DL (ref 13–17.7)
MCH RBC QN AUTO: 32.3 PG (ref 26.6–33)
MCHC RBC AUTO-ENTMCNC: 33.1 G/DL (ref 31.5–35.7)
MCV RBC AUTO: 97.3 FL (ref 79–97)
MRSA DNA SPEC QL NAA+PROBE: NEGATIVE
PLATELET # BLD AUTO: 225 10*3/MM3 (ref 140–450)
PMV BLD AUTO: 11.5 FL (ref 6–12)
POTASSIUM SERPL-SCNC: 4.4 MMOL/L (ref 3.5–5.2)
QT INTERVAL: 402 MS
QTC INTERVAL: 397 MS
RBC # BLD AUTO: 5.27 10*6/MM3 (ref 4.14–5.8)
SARS-COV-2 RNA PNL SPEC NAA+PROBE: NOT DETECTED
SODIUM SERPL-SCNC: 141 MMOL/L (ref 136–145)
WBC # BLD AUTO: 9.05 10*3/MM3 (ref 3.4–10.8)

## 2021-11-01 PROCEDURE — 83036 HEMOGLOBIN GLYCOSYLATED A1C: CPT

## 2021-11-01 PROCEDURE — C9803 HOPD COVID-19 SPEC COLLECT: HCPCS

## 2021-11-01 PROCEDURE — 36415 COLL VENOUS BLD VENIPUNCTURE: CPT

## 2021-11-01 PROCEDURE — 80048 BASIC METABOLIC PNL TOTAL CA: CPT

## 2021-11-01 PROCEDURE — 93005 ELECTROCARDIOGRAM TRACING: CPT

## 2021-11-01 PROCEDURE — 93010 ELECTROCARDIOGRAM REPORT: CPT | Performed by: INTERNAL MEDICINE

## 2021-11-01 PROCEDURE — U0004 COV-19 TEST NON-CDC HGH THRU: HCPCS

## 2021-11-01 PROCEDURE — 87641 MR-STAPH DNA AMP PROBE: CPT

## 2021-11-01 PROCEDURE — 85027 COMPLETE CBC AUTOMATED: CPT

## 2021-11-01 NOTE — PAT
Pt did not  the Mupirocin when he picked up the CHG.He will  today. He has been using the CHG body wash as instructed.     Patient instructed to drink 20 ounces (or until full) of Gatorade and it needs to be completed 1 hour (for Main OR patients) or 2 hours (scheduled  section patients) before given arrival time for procedure (NO RED Gatorade)    Patient verbalized understanding.

## 2021-11-01 NOTE — DISCHARGE INSTRUCTIONS
Patient to apply Chlorhexadine wipes  to surgical area (as instructed) the night before procedure and the AM of procedure. Wipes provided.    Patient instructed to drink 20 ounces (or until full) of Gatorade and it needs to be completed 1 hour (for Main OR patients) or 2 hours (scheduled  section patients) before given arrival time for procedure (NO RED Gatorade)    Patient verbalized understanding.    Bactroban and Chlorhexidine Prescription prescribed by physician before PAT visit.  Verified with patient that medications were picked up from their pharmacy.  Written instructions given to patient during PAT visit.  Patient/family also instructed to complete skin prep checklist and return the checklist on the day of surgery to preoperative staff.  Patient/family verbalized understanding.    Patient viewed general PAT education video as instructed in their preoperative information received from their surgeon.  Patient stated the general PAT education video was viewed in its entirety and survey completed.  Copies of PAT general education handouts (Incentive Spirometry, Meds to Beds Program, Patient Belongings, Pre-op skin preparation instructions, Blood Glucose testing, Visitor policy, Surgery FAQ, Code H) distributed to patient if not printed. Education related to the PAT pass and skin preparation for surgery (if applicable) completed in PAT as a reinforcement to PAT education video. Patient instructed to return PAT pass provided today as well as completed skin preparation sheet (if applicable) on the day of procedure.     Additionally if patient had not viewed video yet but intended to view it at home or in our waiting area, then referred them to the handout with QR code/link provided during PAT visit.  Instructed patient to complete survey after viewing the video in its entirety.  Encouraged patient/family to read PAT general education handouts thoroughly and notify PAT staff with any questions or concerns.  Patient verbalized understanding of all information and priority content.

## 2021-11-02 ENCOUNTER — ANESTHESIA EVENT (OUTPATIENT)
Dept: PERIOP | Facility: HOSPITAL | Age: 56
End: 2021-11-02

## 2021-11-02 RX ORDER — FAMOTIDINE 10 MG/ML
20 INJECTION, SOLUTION INTRAVENOUS ONCE
Status: CANCELLED | OUTPATIENT
Start: 2021-11-02 | End: 2021-11-02

## 2021-11-03 ENCOUNTER — ANESTHESIA (OUTPATIENT)
Dept: PERIOP | Facility: HOSPITAL | Age: 56
End: 2021-11-03

## 2021-11-03 ENCOUNTER — APPOINTMENT (OUTPATIENT)
Dept: GENERAL RADIOLOGY | Facility: HOSPITAL | Age: 56
End: 2021-11-03

## 2021-11-03 ENCOUNTER — HOSPITAL ENCOUNTER (OUTPATIENT)
Facility: HOSPITAL | Age: 56
Discharge: HOME | End: 2021-11-03
Attending: NEUROLOGICAL SURGERY | Admitting: NEUROLOGICAL SURGERY

## 2021-11-03 VITALS
HEIGHT: 73 IN | RESPIRATION RATE: 15 BRPM | OXYGEN SATURATION: 100 % | TEMPERATURE: 97 F | SYSTOLIC BLOOD PRESSURE: 129 MMHG | WEIGHT: 204 LBS | DIASTOLIC BLOOD PRESSURE: 69 MMHG | HEART RATE: 67 BPM | BODY MASS INDEX: 27.04 KG/M2

## 2021-11-03 DIAGNOSIS — M50.90 DISC DISORDER OF CERVICAL REGION: ICD-10-CM

## 2021-11-03 PROCEDURE — 22853 INSJ BIOMECHANICAL DEVICE: CPT | Performed by: NEUROLOGICAL SURGERY

## 2021-11-03 PROCEDURE — 22551 ARTHRD ANT NTRBDY CERVICAL: CPT | Performed by: PHYSICIAN ASSISTANT

## 2021-11-03 PROCEDURE — 22853 INSJ BIOMECHANICAL DEVICE: CPT | Performed by: PHYSICIAN ASSISTANT

## 2021-11-03 PROCEDURE — 25010000002 FENTANYL CITRATE (PF) 50 MCG/ML SOLUTION

## 2021-11-03 PROCEDURE — C1889 IMPLANT/INSERT DEVICE, NOC: HCPCS | Performed by: NEUROLOGICAL SURGERY

## 2021-11-03 PROCEDURE — C1713 ANCHOR/SCREW BN/BN,TIS/BN: HCPCS | Performed by: NEUROLOGICAL SURGERY

## 2021-11-03 PROCEDURE — 25010000002 FENTANYL CITRATE (PF) 50 MCG/ML SOLUTION: Performed by: NURSE ANESTHETIST, CERTIFIED REGISTERED

## 2021-11-03 PROCEDURE — 25010000002 PROPOFOL 10 MG/ML EMULSION: Performed by: NURSE ANESTHETIST, CERTIFIED REGISTERED

## 2021-11-03 PROCEDURE — 25010000002 ONDANSETRON PER 1 MG

## 2021-11-03 PROCEDURE — 25010000002 DEXAMETHASONE PER 1 MG: Performed by: NURSE ANESTHETIST, CERTIFIED REGISTERED

## 2021-11-03 PROCEDURE — 0 CEFAZOLIN IN DEXTROSE 2-4 GM/100ML-% SOLUTION: Performed by: NEUROLOGICAL SURGERY

## 2021-11-03 PROCEDURE — 25010000002 ONDANSETRON PER 1 MG: Performed by: NURSE ANESTHETIST, CERTIFIED REGISTERED

## 2021-11-03 PROCEDURE — 22845 INSERT SPINE FIXATION DEVICE: CPT | Performed by: PHYSICIAN ASSISTANT

## 2021-11-03 PROCEDURE — 76000 FLUOROSCOPY <1 HR PHYS/QHP: CPT

## 2021-11-03 PROCEDURE — 22845 INSERT SPINE FIXATION DEVICE: CPT | Performed by: NEUROLOGICAL SURGERY

## 2021-11-03 PROCEDURE — 22551 ARTHRD ANT NTRBDY CERVICAL: CPT | Performed by: NEUROLOGICAL SURGERY

## 2021-11-03 DEVICE — FLOSEAL HEMOSTATIC MATRIX, 10ML
Type: IMPLANTABLE DEVICE | Site: SPINE CERVICAL | Status: FUNCTIONAL
Brand: FLOSEAL HEMOSTATIC MATRIX

## 2021-11-03 DEVICE — BENGAL SYSTEM STANDARD IMPLANT 6MM, 7 DEGREES
Type: IMPLANTABLE DEVICE | Site: SPINE CERVICAL | Status: FUNCTIONAL
Brand: BENGAL

## 2021-11-03 DEVICE — SURGICAL INSTRUMENTS COMPRESSION PIN 14MM: Type: IMPLANTABLE DEVICE | Site: SPINE CERVICAL | Status: FUNCTIONAL

## 2021-11-03 DEVICE — ALLOGRFT BONE VIVIGEN CELLUAR MATRX FORMABLE 1CC: Type: IMPLANTABLE DEVICE | Site: SPINE CERVICAL | Status: FUNCTIONAL

## 2021-11-03 DEVICE — CLIP LIGAT VASC HORIZON TI SM YEL 6CT: Type: IMPLANTABLE DEVICE | Site: SPINE CERVICAL | Status: FUNCTIONAL

## 2021-11-03 DEVICE — PLT SKYLINE 1LEVEL 14MM: Type: IMPLANTABLE DEVICE | Site: SPINE CERVICAL | Status: FUNCTIONAL

## 2021-11-03 DEVICE — HEMOST ABS SURGIFOAM SZ100 8X12 10MM: Type: IMPLANTABLE DEVICE | Site: SPINE CERVICAL | Status: FUNCTIONAL

## 2021-11-03 DEVICE — SCRW SKYLINE VAR SD 16MM: Type: IMPLANTABLE DEVICE | Site: SPINE CERVICAL | Status: FUNCTIONAL

## 2021-11-03 DEVICE — CLIP LIGAT VASC HORIZON TI MD BLU 6CT: Type: IMPLANTABLE DEVICE | Site: SPINE CERVICAL | Status: FUNCTIONAL

## 2021-11-03 RX ORDER — DEXAMETHASONE SODIUM PHOSPHATE 4 MG/ML
INJECTION, SOLUTION INTRA-ARTICULAR; INTRALESIONAL; INTRAMUSCULAR; INTRAVENOUS; SOFT TISSUE AS NEEDED
Status: DISCONTINUED | OUTPATIENT
Start: 2021-11-03 | End: 2021-11-03 | Stop reason: SURG

## 2021-11-03 RX ORDER — FAMOTIDINE 20 MG/1
20 TABLET, FILM COATED ORAL ONCE
Status: DISCONTINUED | OUTPATIENT
Start: 2021-11-03 | End: 2021-11-03

## 2021-11-03 RX ORDER — MIDAZOLAM HYDROCHLORIDE 1 MG/ML
1 INJECTION INTRAMUSCULAR; INTRAVENOUS
Status: DISCONTINUED | OUTPATIENT
Start: 2021-11-03 | End: 2021-11-03 | Stop reason: HOSPADM

## 2021-11-03 RX ORDER — MEPERIDINE HYDROCHLORIDE 25 MG/ML
12.5 INJECTION INTRAMUSCULAR; INTRAVENOUS; SUBCUTANEOUS
Status: DISCONTINUED | OUTPATIENT
Start: 2021-11-03 | End: 2021-11-03 | Stop reason: HOSPADM

## 2021-11-03 RX ORDER — ROCURONIUM BROMIDE 10 MG/ML
INJECTION, SOLUTION INTRAVENOUS AS NEEDED
Status: DISCONTINUED | OUTPATIENT
Start: 2021-11-03 | End: 2021-11-03 | Stop reason: SURG

## 2021-11-03 RX ORDER — MINERAL OIL
OIL (ML) MISCELLANEOUS AS NEEDED
Status: DISCONTINUED | OUTPATIENT
Start: 2021-11-03 | End: 2021-11-03 | Stop reason: HOSPADM

## 2021-11-03 RX ORDER — FENTANYL CITRATE 50 UG/ML
50 INJECTION, SOLUTION INTRAMUSCULAR; INTRAVENOUS
Status: DISCONTINUED | OUTPATIENT
Start: 2021-11-03 | End: 2021-11-03 | Stop reason: HOSPADM

## 2021-11-03 RX ORDER — FAMOTIDINE 20 MG/1
20 TABLET, FILM COATED ORAL
Status: COMPLETED | OUTPATIENT
Start: 2021-11-03 | End: 2021-11-03

## 2021-11-03 RX ORDER — TRAMADOL HYDROCHLORIDE 50 MG/1
50 TABLET ORAL EVERY 6 HOURS PRN
Qty: 50 TABLET | Refills: 0 | Status: SHIPPED | OUTPATIENT
Start: 2021-11-03

## 2021-11-03 RX ORDER — OXYCODONE AND ACETAMINOPHEN 10; 325 MG/1; MG/1
1 TABLET ORAL EVERY 4 HOURS PRN
Status: DISCONTINUED | OUTPATIENT
Start: 2021-11-03 | End: 2021-11-03 | Stop reason: HOSPADM

## 2021-11-03 RX ORDER — FENTANYL CITRATE 50 UG/ML
INJECTION, SOLUTION INTRAMUSCULAR; INTRAVENOUS
Status: COMPLETED
Start: 2021-11-03 | End: 2021-11-03

## 2021-11-03 RX ORDER — IBUPROFEN 800 MG/1
800 TABLET ORAL ONCE
Status: COMPLETED | OUTPATIENT
Start: 2021-11-03 | End: 2021-11-03

## 2021-11-03 RX ORDER — SODIUM CHLORIDE 0.9 % (FLUSH) 0.9 %
10 SYRINGE (ML) INJECTION EVERY 12 HOURS SCHEDULED
Status: DISCONTINUED | OUTPATIENT
Start: 2021-11-03 | End: 2021-11-03 | Stop reason: HOSPADM

## 2021-11-03 RX ORDER — PROPOFOL 10 MG/ML
VIAL (ML) INTRAVENOUS AS NEEDED
Status: DISCONTINUED | OUTPATIENT
Start: 2021-11-03 | End: 2021-11-03 | Stop reason: SURG

## 2021-11-03 RX ORDER — SODIUM CHLORIDE 0.9 % (FLUSH) 0.9 %
1-10 SYRINGE (ML) INJECTION AS NEEDED
Status: DISCONTINUED | OUTPATIENT
Start: 2021-11-03 | End: 2021-11-03 | Stop reason: HOSPADM

## 2021-11-03 RX ORDER — HYDROMORPHONE HYDROCHLORIDE 1 MG/ML
0.5 INJECTION, SOLUTION INTRAMUSCULAR; INTRAVENOUS; SUBCUTANEOUS
Status: DISCONTINUED | OUTPATIENT
Start: 2021-11-03 | End: 2021-11-03 | Stop reason: HOSPADM

## 2021-11-03 RX ORDER — IPRATROPIUM BROMIDE AND ALBUTEROL SULFATE 2.5; .5 MG/3ML; MG/3ML
3 SOLUTION RESPIRATORY (INHALATION) ONCE AS NEEDED
Status: DISCONTINUED | OUTPATIENT
Start: 2021-11-03 | End: 2021-11-03 | Stop reason: HOSPADM

## 2021-11-03 RX ORDER — MAGNESIUM HYDROXIDE 1200 MG/15ML
LIQUID ORAL AS NEEDED
Status: DISCONTINUED | OUTPATIENT
Start: 2021-11-03 | End: 2021-11-03 | Stop reason: HOSPADM

## 2021-11-03 RX ORDER — METHOCARBAMOL 750 MG/1
750 TABLET, FILM COATED ORAL 4 TIMES DAILY PRN
Qty: 90 TABLET | Refills: 0 | Status: SHIPPED | OUTPATIENT
Start: 2021-11-03

## 2021-11-03 RX ORDER — ACETAMINOPHEN 325 MG/1
650 TABLET ORAL ONCE AS NEEDED
Status: DISCONTINUED | OUTPATIENT
Start: 2021-11-03 | End: 2021-11-03 | Stop reason: HOSPADM

## 2021-11-03 RX ORDER — ACETAMINOPHEN 325 MG/1
650 TABLET ORAL ONCE
Status: COMPLETED | OUTPATIENT
Start: 2021-11-03 | End: 2021-11-03

## 2021-11-03 RX ORDER — LIDOCAINE HYDROCHLORIDE 10 MG/ML
INJECTION, SOLUTION EPIDURAL; INFILTRATION; INTRACAUDAL; PERINEURAL AS NEEDED
Status: DISCONTINUED | OUTPATIENT
Start: 2021-11-03 | End: 2021-11-03 | Stop reason: SURG

## 2021-11-03 RX ORDER — ONDANSETRON 2 MG/ML
4 INJECTION INTRAMUSCULAR; INTRAVENOUS ONCE AS NEEDED
Status: COMPLETED | OUTPATIENT
Start: 2021-11-03 | End: 2021-11-03

## 2021-11-03 RX ORDER — CEFAZOLIN SODIUM 2 G/100ML
2 INJECTION, SOLUTION INTRAVENOUS ONCE
Status: COMPLETED | OUTPATIENT
Start: 2021-11-03 | End: 2021-11-03

## 2021-11-03 RX ORDER — ONDANSETRON 2 MG/ML
INJECTION INTRAMUSCULAR; INTRAVENOUS
Status: COMPLETED
Start: 2021-11-03 | End: 2021-11-03

## 2021-11-03 RX ORDER — LIDOCAINE HYDROCHLORIDE AND EPINEPHRINE 5; 5 MG/ML; UG/ML
INJECTION, SOLUTION INFILTRATION; PERINEURAL AS NEEDED
Status: DISCONTINUED | OUTPATIENT
Start: 2021-11-03 | End: 2021-11-03 | Stop reason: HOSPADM

## 2021-11-03 RX ORDER — FENTANYL CITRATE 50 UG/ML
INJECTION, SOLUTION INTRAMUSCULAR; INTRAVENOUS AS NEEDED
Status: DISCONTINUED | OUTPATIENT
Start: 2021-11-03 | End: 2021-11-03 | Stop reason: SURG

## 2021-11-03 RX ORDER — SODIUM CHLORIDE, SODIUM LACTATE, POTASSIUM CHLORIDE, CALCIUM CHLORIDE 600; 310; 30; 20 MG/100ML; MG/100ML; MG/100ML; MG/100ML
9 INJECTION, SOLUTION INTRAVENOUS CONTINUOUS
Status: DISCONTINUED | OUTPATIENT
Start: 2021-11-03 | End: 2021-11-03 | Stop reason: HOSPADM

## 2021-11-03 RX ORDER — LIDOCAINE HYDROCHLORIDE 10 MG/ML
0.5 INJECTION, SOLUTION EPIDURAL; INFILTRATION; INTRACAUDAL; PERINEURAL ONCE AS NEEDED
Status: COMPLETED | OUTPATIENT
Start: 2021-11-03 | End: 2021-11-03

## 2021-11-03 RX ORDER — SODIUM CHLORIDE 0.9 % (FLUSH) 0.9 %
10 SYRINGE (ML) INJECTION AS NEEDED
Status: DISCONTINUED | OUTPATIENT
Start: 2021-11-03 | End: 2021-11-03 | Stop reason: HOSPADM

## 2021-11-03 RX ORDER — HYDROCODONE BITARTRATE AND ACETAMINOPHEN 7.5; 325 MG/1; MG/1
1 TABLET ORAL ONCE
Status: COMPLETED | OUTPATIENT
Start: 2021-11-03 | End: 2021-11-03

## 2021-11-03 RX ORDER — ONDANSETRON 2 MG/ML
INJECTION INTRAMUSCULAR; INTRAVENOUS AS NEEDED
Status: DISCONTINUED | OUTPATIENT
Start: 2021-11-03 | End: 2021-11-03 | Stop reason: SURG

## 2021-11-03 RX ORDER — OXYCODONE AND ACETAMINOPHEN 7.5; 325 MG/1; MG/1
1 TABLET ORAL ONCE AS NEEDED
Status: COMPLETED | OUTPATIENT
Start: 2021-11-03 | End: 2021-11-03

## 2021-11-03 RX ORDER — ACETAMINOPHEN 650 MG/1
650 SUPPOSITORY RECTAL ONCE AS NEEDED
Status: DISCONTINUED | OUTPATIENT
Start: 2021-11-03 | End: 2021-11-03 | Stop reason: HOSPADM

## 2021-11-03 RX ORDER — OXYCODONE AND ACETAMINOPHEN 7.5; 325 MG/1; MG/1
TABLET ORAL
Status: COMPLETED
Start: 2021-11-03 | End: 2021-11-03

## 2021-11-03 RX ADMIN — LIDOCAINE HYDROCHLORIDE 50 MG: 10 INJECTION, SOLUTION EPIDURAL; INFILTRATION; INTRACAUDAL; PERINEURAL at 07:46

## 2021-11-03 RX ADMIN — ACETAMINOPHEN 650 MG: 325 TABLET ORAL at 07:12

## 2021-11-03 RX ADMIN — ONDANSETRON 4 MG: 2 INJECTION INTRAMUSCULAR; INTRAVENOUS at 08:59

## 2021-11-03 RX ADMIN — PROPOFOL 25 MCG/KG/MIN: 10 INJECTION, EMULSION INTRAVENOUS at 07:51

## 2021-11-03 RX ADMIN — FENTANYL CITRATE 50 MCG: 50 INJECTION, SOLUTION INTRAMUSCULAR; INTRAVENOUS at 09:39

## 2021-11-03 RX ADMIN — FAMOTIDINE 20 MG: 20 TABLET ORAL at 07:12

## 2021-11-03 RX ADMIN — PROPOFOL 200 MG: 10 INJECTION, EMULSION INTRAVENOUS at 07:46

## 2021-11-03 RX ADMIN — IBUPROFEN 800 MG: 800 TABLET, FILM COATED ORAL at 07:12

## 2021-11-03 RX ADMIN — DEXAMETHASONE SODIUM PHOSPHATE 8 MG: 4 INJECTION, SOLUTION INTRA-ARTICULAR; INTRALESIONAL; INTRAMUSCULAR; INTRAVENOUS; SOFT TISSUE at 08:01

## 2021-11-03 RX ADMIN — LIDOCAINE HYDROCHLORIDE 0.2 ML: 10 INJECTION, SOLUTION EPIDURAL; INFILTRATION; INTRACAUDAL; PERINEURAL at 07:00

## 2021-11-03 RX ADMIN — HYDROCODONE BITARTRATE AND ACETAMINOPHEN 1 TABLET: 7.5; 325 TABLET ORAL at 07:12

## 2021-11-03 RX ADMIN — FENTANYL CITRATE 50 MCG: 50 INJECTION, SOLUTION INTRAMUSCULAR; INTRAVENOUS at 09:54

## 2021-11-03 RX ADMIN — FENTANYL CITRATE 50 MCG: 50 INJECTION, SOLUTION INTRAMUSCULAR; INTRAVENOUS at 07:46

## 2021-11-03 RX ADMIN — ONDANSETRON 4 MG: 2 INJECTION INTRAMUSCULAR; INTRAVENOUS at 10:54

## 2021-11-03 RX ADMIN — SODIUM CHLORIDE, POTASSIUM CHLORIDE, SODIUM LACTATE AND CALCIUM CHLORIDE 9 ML/HR: 600; 310; 30; 20 INJECTION, SOLUTION INTRAVENOUS at 07:00

## 2021-11-03 RX ADMIN — ROCURONIUM BROMIDE 50 MG: 10 INJECTION, SOLUTION INTRAVENOUS at 07:46

## 2021-11-03 RX ADMIN — FENTANYL CITRATE 50 MCG: 50 INJECTION, SOLUTION INTRAMUSCULAR; INTRAVENOUS at 07:41

## 2021-11-03 RX ADMIN — OXYCODONE HYDROCHLORIDE AND ACETAMINOPHEN 1 TABLET: 7.5; 325 TABLET ORAL at 10:22

## 2021-11-03 RX ADMIN — OXYCODONE AND ACETAMINOPHEN 1 TABLET: 7.5; 325 TABLET ORAL at 10:22

## 2021-11-03 RX ADMIN — CEFAZOLIN SODIUM 2 G: 2 INJECTION, SOLUTION INTRAVENOUS at 07:41

## 2021-11-03 NOTE — ANESTHESIA PROCEDURE NOTES
Airway  Urgency: elective    Date/Time: 11/3/2021 7:46 AM  End Time:11/3/2021 7:49 AM  Airway not difficult    General Information and Staff    Patient location during procedure: OR  CRNA: Deejay Valenzuela CRNA    Indications and Patient Condition  Indications for airway management: airway protection    Preoxygenated: yes  MILS maintained throughout  Mask difficulty assessment: 1 - vent by mask    Final Airway Details  Final airway type: endotracheal airway      Successful airway: ETT  Cuffed: yes   Successful intubation technique: video laryngoscopy  Facilitating devices/methods: intubating stylet  Endotracheal tube insertion site: oral  Blade: Glidescope (used glidescope to keep neck neutral for acdf)  Blade size: 4  ETT size (mm): 7.5  Cormack-Lehane Classification: grade I - full view of glottis  Placement verified by: chest auscultation and capnometry   Measured from: lips  ETT/EBT  to lips (cm): 24  Number of attempts at approach: 1  Assessment: lips, teeth, and gum same as pre-op and atraumatic intubation    Additional Comments  Negative epigastric sounds, Breath sound equal bilaterally with symmetric chest rise and fall

## 2021-11-03 NOTE — OP NOTE
Preoperative diagnosis cervical disc disease C6-7 left disc herniation    Postoperative diagnosis same    Procedures  1.  Anterior cervical discectomy C6-7   2.  Anterior arthrodesis C6-7  3.  Placement of the bangle 6 mm cage graft, 16mm screws  4.  Anterior segmental instrumentation using Depuy skyline plate and screws  5.  Fluoroscopy to confirm level intraoperative    Gen. endotracheal anesthesia    Surgeon Roldan Murphy M.D.  Assistant Semaj Sosa my physician's assistant was present and personally scrubbed the entirety of the procedure, they assisted suctioning, retraction, approach, and closure of the case    Minimal blood loss  Applications none  Procedure in detail    After formal written consent was obtained explaining risks and benefits of procedure patient was taken to operating room.  All bony prominences and genitalia were padded to prevent neurologic injury.  Preoperative antimicrobial prophylaxis was given per protocol.  Patient was placed in neutral C-spine and prepped and draped in usual sterile manner.  Anterior incision was made after local infiltration per record.    Dissection was carried down to skin and subcutaneous tissues.  The carotid artery and tracheoesophageal bundle were gently dissected and mobilized respectively to allow access to the anterior longitudinal spine and ligament.  At this point in time soft tissues were cleared using blunt dissection fluoroscopic guidance identified the correct levels.  At this point in time a thorough discectomy was performed after placement of distraction pins.  The correct levels were confirmed C6-7 the posterior ligament was identified after thorough discectomy and drilled down using high-speed bur the posterior longitudinal ligament was removed and the disc space was removed and explored to ensure adequate decompression of the nerves and nerve roots respectively.  Fluoroscopic guidance confirmed correct levels and decompression.  A large  eccentric disc herniation was seen in the foramina that had ripped through the posterior longitudinal ligament this was removed    At this point time the anterior bangle cage placed after being milled to the appropriate size, and the anterior plating system was placed and torqued to appropriate tightness.  Fluoroscopic imaging identified again the correct level meticulous hemostasis maintained and the skin was closed in layers.    I performed the entire surgery till the closure of the skin.

## 2021-11-03 NOTE — ANESTHESIA PREPROCEDURE EVALUATION
Anesthesia Evaluation     Patient summary reviewed and Nursing notes reviewed   NPO Solid Status: > 8 hours  NPO Liquid Status: > 2 hours           Airway   Mallampati: III  TM distance: >3 FB  Neck ROM: limited  Possible difficult intubation  Dental      Pulmonary    (+) a smoker Current Abstained day of surgery, sleep apnea (no CPAP),   (-) COPD, asthma, shortness of breath, recent URI  Cardiovascular     ECG reviewed    (-) hypertension, past MI, dysrhythmias, angina, cardiac stents, hyperlipidemia    ROS comment: ECG SB   ECHO GXT  ·no chest pain / ST changes / dysrhythmias.  normal MPS -no evidence of ischemia.  EF = 51%  Low risk study        Neuro/Psych  (-) seizures, CVA  GI/Hepatic/Renal/Endo    (-) liver disease, no renal disease, diabetes, no thyroid disorder    Musculoskeletal     Abdominal    Substance History      OB/GYN          Other                        Anesthesia Plan    ASA 2     general   (Glidescope standby)  intravenous induction     Anesthetic plan, all risks, benefits, and alternatives have been provided, discussed and informed consent has been obtained with: patient.    Plan discussed with CRNA.

## 2021-11-03 NOTE — ANESTHESIA POSTPROCEDURE EVALUATION
Patient: Jag Bueno    Procedure Summary     Date: 11/03/21 Room / Location:  [facility] [address] [place] [address] [facility]    Anesthesia Start: 0741 Anesthesia Stop: 0917    Procedure: CERVICAL DISCECTOMY ANTERIOR WITH FUSION C6-7 LEFT (Left Spine Cervical) Diagnosis:       Disc disorder of cervical region      (Disc disorder of cervical region [M50.90])    Surgeons: Roldan Murphy MD Provider: Lee Henderson MD    Anesthesia Type: general ASA Status: 2          Anesthesia Type: general    Vitals  Vitals Value Taken Time   /48 11/03/21 0914   Temp     Pulse     Resp     SpO2 95 % 11/03/21 0916   Vitals shown include unvalidated device data.        Post Anesthesia Care and Evaluation    Patient location during evaluation: PACU  Patient participation: complete - patient participated  Level of consciousness: awake and alert  Pain management: adequate  Airway patency: patent  Anesthetic complications: No anesthetic complications  PONV Status: none  Cardiovascular status: hemodynamically stable and acceptable  Respiratory status: nonlabored ventilation, acceptable and nasal cannula  Hydration status: acceptable

## 2021-11-03 NOTE — H&P
Pre-Op H&P  Jag Bueno  [id number]  1965    Chief complaint: neck and left shoulder pain     HPI:    Patient is a 56 y.o.male who presents with a history of neck pain and left upper extremity pain. He was found to have a disc herniation at the C6-7 level. He states his pain has improved somewhat but is reliant on medication to achieve this. He states he still has medial scapular pain with radiation down the arm on the left side. He presents to the operating room today for surgical management with an ACDF at the C6-7 level.     Review of Systems:  General ROS: negative for chills, fever or skin lesions;  No changes since last office visit.  Neg for recent sick exposure  Cardiovascular ROS: no chest pain or dyspnea on exertion  Respiratory ROS: no cough, shortness of breath, or wheezing    Allergies:   Allergies   Allergen Reactions   • Codeine Shortness Of Breath       Home Meds:    No current facility-administered medications on file prior to encounter.     Current Outpatient Medications on File Prior to Encounter   Medication Sig Dispense Refill   • AndroGel Pump 20.25 MG/ACT (1.62%) gel Out of med for last 3 weeks     • chlorhexidine (HIBICLENS) 4 % external liquid Shower each day with solution for 5 days beginning 5 days before surgery. (Patient taking differently: Apply 1 application topically to the appropriate area as directed Take As Directed. Shower each day with solution for 5 days beginning 5 days before surgery.) 120 mL 0   • diclofenac (VOLTAREN) 50 MG EC tablet TAKE ONE TABLET BY MOUTH TWICE A DAY FOR PAIN (Patient taking differently: Take 50 mg by mouth 2 (Two) Times a Day.) 60 tablet 1   • Juluca 50-25 MG per tablet Take 1 tablet by mouth Daily.     • loperamide (IMODIUM) 2 MG capsule Take 2 mg by mouth As Needed for Diarrhea.     • LORazepam (ATIVAN) 1 MG tablet Take 1 mg by mouth Every 12 (Twelve) Hours As Needed.     • pregabalin (LYRICA) 75 MG capsule TAKE ONE CAPSULE BY MOUTH TWICE A  DAY FOR PAIN (Patient taking differently: Take 75 mg by mouth 2 (Two) Times a Day.) 60 capsule 1   • tadalafil (CIALIS) 5 MG tablet Take 2.5 mg by mouth Daily.     • Testosterone Cypionate (DEPOTESTOTERONE CYPIONATE) 200 MG/ML injection Inject 200 mg into the appropriate muscle as directed by prescriber Every 14 (Fourteen) Days. 2 TIMES  PER MONTH     • traMADol (ULTRAM) 50 MG tablet Take 50 mg by mouth 2 (two) times a day. for pain         PMH:   Past Medical History:   Diagnosis Date   • AIDS (acquired immune deficiency syndrome) ([facility])    • Anxiety    • HIV (human immunodeficiency virus infection) ([facility])    • Sleep apnea     no c-pap     PSH:    Past Surgical History:   Procedure Laterality Date   • APPENDECTOMY     • COLONOSCOPY  2020   • EYE SURGERY Bilateral     lasik   • GALLBLADDER SURGERY         Social History:   Tobacco:   Social History     Tobacco Use   Smoking Status Current Every Day Smoker   • Packs/day: 0.50   • Years: 40.00   • Pack years: 20.00   • Types: Cigarettes   Smokeless Tobacco Never Used      Alcohol:     Social History     Substance and Sexual Activity   Alcohol Use Yes    Comment: 2 drinks per week       Physical Exam:  General Appearance:    Alert, cooperative, no distress, appears stated age   Head:    Normocephalic, without obvious abnormality, atraumatic   Lungs:     Clear to auscultation bilaterally, respirations unlabored    Heart:   Regular rate and rhythm, S1 and S2 normal, no murmur, rub    or gallop    Abdomen:    Soft, nontender.  +bowel sounds   Breast Exam:    deferred   Genitalia:    deferred   Extremities:   Extremities normal, atraumatic, no cyanosis or edema   Skin:   Skin color, texture, turgor normal, no rashes or lesions   Neurologic:   Grossly intact   Results Review  LABS:  Lab Results   Component Value Date    WBC 9.05 11/01/2021    HGB 17.0 11/01/2021    HCT 51.3 (H) 11/01/2021    MCV 97.3 (H) 11/01/2021     11/01/2021    GLUCOSE 78 11/01/2021    BUN 16  11/01/2021    CREATININE 1.09 11/01/2021    EGFRIFNONA 70 11/01/2021     11/01/2021    K 4.4 11/01/2021     11/01/2021    CO2 28.0 11/01/2021    CALCIUM 10.3 11/01/2021       RADIOLOGY:  No radiology results for the last 3 days     Cancer Staging (if applicable)  Cancer Patient: __ yes _x_no __unknown; If yes, clinical stage T:__ N:__M:__, stage group or __N/A    Impression: left disc herniation C6-7    Plan: anterior cervical discectomy with fusion C6-C7 - left       John Jung PA-C   11/03/21   6:51 AM EDT

## 2021-11-09 DIAGNOSIS — M50.20 RUPTURED DISC, CERVICAL: ICD-10-CM

## 2021-11-09 DIAGNOSIS — M47.812 ARTHRITIS OF NECK: Primary | ICD-10-CM

## 2021-11-09 NOTE — TELEPHONE ENCOUNTER
Provider:  Jeffrey  Caller: fax  Time of call:     Phone #:  1:[phone]  Surgery:  ACDF  Surgery Date:  11/03/21  Last visit:   ana  Next visit:     ZULMA:         Reason for call:     Patient requests refill on Diclofenac 50 mg bid.

## 2021-11-10 NOTE — TELEPHONE ENCOUNTER
1. I spoke to patient and told him he should not be taking the diclofenac. Patient states that he has been taking it, PRN. He reports that he already got another refill but he won't take it. Please refuse this refill.     2. Okay to schedule patient's post-op appointment?  Any new imaging needed?    3. Patient states that he was doing good but he is still experiencing the same left shoulder pain as before surgery. He wants to know if this is normal?    4. Patient wants to know how long he needs to wear the tape on his neck? He said it is driving him insane.

## 2021-11-11 ENCOUNTER — TELEPHONE (OUTPATIENT)
Dept: NEUROSURGERY | Facility: CLINIC | Age: 56
End: 2021-11-11

## 2021-11-11 NOTE — TELEPHONE ENCOUNTER
Provider:  Jeffrey  Caller: patient  Time of call:  4:32   Phone #:  [phone]  Surgery:  Cervical Discectomy Anterior   Surgery Date: 11-3-21   Last visit: 11-4-21    Next visit: 12-6-21    ZULMA:         Reason for call: Patient called and had some questions about his surgery and the symptoms he was having. Please Advise. Thank you.        When did it start:2 days ago    Where is it located:left shoulder blade neck    How long has this been going on/is this new or the same as before surgery:  Different /pain    Characteristics of symptom/severity:sharp  pain around shoulders and the neck is a dull ackey pain. He is also having headaches a little dizzy at times.    Timing- Is it constant or intermittent:intermittent    What makes it worse:nothing    What makes it better:nothing    What therapies/medications have you tried:Ultram helps with neck but not the sharp stabbing pain.

## 2021-11-11 NOTE — TELEPHONE ENCOUNTER
This type of pain can be normal after surgery.  He should continue taking the tramadol, muscle relaxer and Lyrica.

## 2021-12-06 ENCOUNTER — OFFICE VISIT (OUTPATIENT)
Dept: NEUROSURGERY | Facility: CLINIC | Age: 56
End: 2021-12-06

## 2021-12-06 VITALS
HEIGHT: 73 IN | DIASTOLIC BLOOD PRESSURE: 72 MMHG | SYSTOLIC BLOOD PRESSURE: 128 MMHG | WEIGHT: 202.8 LBS | TEMPERATURE: 97.1 F | BODY MASS INDEX: 26.88 KG/M2

## 2021-12-06 DIAGNOSIS — Z98.1 S/P CERVICAL SPINAL FUSION: Primary | ICD-10-CM

## 2021-12-06 PROCEDURE — 99024 POSTOP FOLLOW-UP VISIT: CPT | Performed by: PHYSICIAN ASSISTANT

## 2021-12-06 NOTE — PATIENT INSTRUCTIONS

## 2021-12-06 NOTE — PROGRESS NOTES
Subjective   Patient ID: Jag Bueno is a 56 y.o. male is here today for follow-up for wound check.    HPI:      Patient denies 56-year-old gentleman who is known to the neurosurgical practice for having terrible left arm numbness that is incessant along with a C7 radiculopathy and some wrist extensor weakness prior to his C6-7 anterior cervical discectomy and fusion.  Procedure was performed on 11/3/2021 and since then patient has noted episodic pain in the same distribution there is typically associated with worsening neck pain.    Patient sounds like he is overdoing it a little bit of causing a flareup in his neck which also causes some radicular complaints.  Patient states this is not nearly as bad as it was prior to surgery but is still there and a little frustrating.    From a surgical standpoint procedure went well but his symptoms are still there but at a lesser degree.  I discussed with him that this is likely secondary to some nerve or bruising because of his massive left-sided C6-7 disc    The following portions of the patient's history were reviewed and updated as appropriate: allergies, current medications, past family history, past medical history, past social history, past surgical history and problem list.    Review of Systems   Constitutional: Negative for activity change, appetite change, chills, diaphoresis, fatigue, fever and unexpected weight change.   HENT: Positive for ear pain, rhinorrhea and sinus pressure. Negative for congestion, dental problem, drooling, ear discharge, facial swelling, hearing loss, mouth sores, nosebleeds, postnasal drip, sinus pain, sneezing, sore throat, tinnitus, trouble swallowing and voice change.    Eyes: Negative for photophobia, pain, discharge, redness, itching and visual disturbance.   Respiratory: Negative for apnea, cough, choking, chest tightness, shortness of breath, wheezing and stridor.    Cardiovascular: Negative for chest pain, palpitations and  "leg swelling.   Gastrointestinal: Positive for diarrhea. Negative for abdominal distention, abdominal pain, anal bleeding, blood in stool, constipation, nausea, rectal pain and vomiting.   Endocrine: Negative for cold intolerance, heat intolerance, polydipsia, polyphagia and polyuria.   Genitourinary: Negative for decreased urine volume, difficulty urinating, dysuria, enuresis, flank pain, frequency, genital sores, hematuria, penile discharge, penile pain, penile swelling, scrotal swelling, testicular pain and urgency.   Musculoskeletal: Positive for neck pain. Negative for arthralgias, back pain, gait problem, joint swelling, myalgias and neck stiffness.   Skin: Negative for color change, pallor, rash and wound.   Allergic/Immunologic: Positive for environmental allergies. Negative for food allergies and immunocompromised state.   Neurological: Negative for dizziness, tremors, seizures, syncope, facial asymmetry, speech difficulty, weakness, light-headedness, numbness and headaches.   Hematological: Negative for adenopathy. Does not bruise/bleed easily.   Psychiatric/Behavioral: Negative for agitation, behavioral problems, confusion, decreased concentration, dysphoric mood, hallucinations, self-injury, sleep disturbance and suicidal ideas. The patient is not nervous/anxious and is not hyperactive.          Objective    reports that he has been smoking cigarettes. He has a 20.00 pack-year smoking history. He has never used smokeless tobacco. He reports current alcohol use. He reports current drug use. Drug: Marijuana.   SMOKING STATUS: I spent greater than 10 minutes educating the patient on smoking cessation, and discussed how smoking pertains to the particular disease process at hand.  The patient acknowledges understanding.      Physical Exam:   Vitals:/72 (BP Location: Right arm, Patient Position: Sitting, Cuff Size: Adult)   Temp 97.1 °F (36.2 °C)   Ht 185.4 cm (73\")   Wt 92 kg (202 lb 12.8 oz)   BMI " 26.76 kg/m²    BMI: Body mass index is 26.76 kg/m².     GENERAL:   The patient is in no acute distress, and is able to answer all questions appropriately.  Skin:  The incision is well-healed and well approximated.  No signs of infection, bleeding, or erythema.  Musculoskeletal:     strength is 5 out of 5 bilaterally.   Shoulder abduction is 5 out of 5.    Dorsiflexion is 5/5 Bilaterally  Plantarflexion is 5/5 bilaterally  Hip Flexion 5/5 bilaterally.    The patient´s gait is normal without antalgia.  Neurologic:   The patient is alert and oriented by 3.     Pupils are equal and reactive to light.    Visual fields are full.    Extraocular movements are intact without nystagmus.    There is no evidence of central motor drift. No facial droop.  No difficulty with rapid alternating movements.    Sensation is equal bilaterally with no deficit.      Reflexes:  2+ @ biceps, triceps, brachioradialis, as well as the patellar and Achilles tendon bilaterally.  No sign of Houser's, clonus, or long track signs      Assessment/Plan   Independent Review of Radiographic Studies:    No new films reviewed at this visit  Medical Decision Making:    Patient is doing very well at this point.  Patient is pleased postsurgical outcome.  Incision is clean, dry.  No signs of infection, bleeding, or erythema.    The patient will follow-up in 4 weeks with AP and lateral x-ray of the cervical spine, after completing physical therapy. The patient understands instructions and limitations for the best post-operative success.    It has been a pleasure providing neurosurgical care.    Semaj Sosa PA-C      Patient's Body mass index is 26.76 kg/m². indicating that he is overweight (BMI 25-29.9). Obesity-related health conditions include the following: obstructive sleep apnea. Obesity is unchanged. BMI is is above average; BMI management plan is completed. We discussed portion control and increasing exercise.    There are no diagnoses linked to  this encounter.  No follow-ups on file.

## 2022-08-16 ENCOUNTER — TRANSCRIBE ORDERS (OUTPATIENT)
Dept: ONCOLOGY | Facility: HOSPITAL | Age: 57
End: 2022-08-16

## 2022-08-17 ENCOUNTER — INFUSION (OUTPATIENT)
Dept: ONCOLOGY | Facility: HOSPITAL | Age: 57
End: 2022-08-17

## 2022-08-17 VITALS
SYSTOLIC BLOOD PRESSURE: 115 MMHG | DIASTOLIC BLOOD PRESSURE: 69 MMHG | RESPIRATION RATE: 18 BRPM | OXYGEN SATURATION: 96 % | TEMPERATURE: 98.4 F | HEART RATE: 70 BPM

## 2022-08-17 DIAGNOSIS — L03.90 CELLULITIS, UNSPECIFIED CELLULITIS SITE: Primary | ICD-10-CM

## 2022-08-17 LAB
ALBUMIN SERPL-MCNC: 4.99 G/DL (ref 3.5–5.2)
ALBUMIN/GLOB SERPL: 2.8 G/DL
ALP SERPL-CCNC: 68 U/L (ref 39–117)
ALT SERPL W P-5'-P-CCNC: 33 U/L (ref 1–41)
ANION GAP SERPL CALCULATED.3IONS-SCNC: 10 MMOL/L (ref 5–15)
AST SERPL-CCNC: 30 U/L (ref 1–40)
BASOPHILS # BLD AUTO: 0.02 10*3/MM3 (ref 0–0.2)
BASOPHILS NFR BLD AUTO: 0.3 % (ref 0–1.5)
BILIRUB SERPL-MCNC: 0.4 MG/DL (ref 0–1.2)
BUN SERPL-MCNC: 14 MG/DL (ref 6–20)
BUN/CREAT SERPL: 14.1 (ref 7–25)
CALCIUM SPEC-SCNC: 10.4 MG/DL (ref 8.6–10.5)
CHLORIDE SERPL-SCNC: 103 MMOL/L (ref 98–107)
CO2 SERPL-SCNC: 26 MMOL/L (ref 22–29)
CREAT SERPL-MCNC: 0.99 MG/DL (ref 0.76–1.27)
CRP SERPL-MCNC: 0.57 MG/DL (ref 0–0.5)
DEPRECATED RDW RBC AUTO: 47.5 FL (ref 37–54)
EGFRCR SERPLBLD CKD-EPI 2021: 88.9 ML/MIN/1.73
EOSINOPHIL # BLD AUTO: 0.22 10*3/MM3 (ref 0–0.4)
EOSINOPHIL NFR BLD AUTO: 2.9 % (ref 0.3–6.2)
ERYTHROCYTE [DISTWIDTH] IN BLOOD BY AUTOMATED COUNT: 13.6 % (ref 12.3–15.4)
GLOBULIN UR ELPH-MCNC: 1.8 GM/DL
GLUCOSE SERPL-MCNC: 107 MG/DL (ref 65–99)
HCT VFR BLD AUTO: 48.4 % (ref 37.5–51)
HGB BLD-MCNC: 16.5 G/DL (ref 13–17.7)
IMM GRANULOCYTES # BLD AUTO: 0.03 10*3/MM3 (ref 0–0.05)
IMM GRANULOCYTES NFR BLD AUTO: 0.4 % (ref 0–0.5)
LYMPHOCYTES # BLD AUTO: 1.91 10*3/MM3 (ref 0.7–3.1)
LYMPHOCYTES NFR BLD AUTO: 25.1 % (ref 19.6–45.3)
MCH RBC QN AUTO: 32.3 PG (ref 26.6–33)
MCHC RBC AUTO-ENTMCNC: 34.1 G/DL (ref 31.5–35.7)
MCV RBC AUTO: 94.7 FL (ref 79–97)
MONOCYTES # BLD AUTO: 0.68 10*3/MM3 (ref 0.1–0.9)
MONOCYTES NFR BLD AUTO: 8.9 % (ref 5–12)
NEUTROPHILS NFR BLD AUTO: 4.76 10*3/MM3 (ref 1.7–7)
NEUTROPHILS NFR BLD AUTO: 62.4 % (ref 42.7–76)
NRBC BLD AUTO-RTO: 0 /100 WBC (ref 0–0.2)
PLATELET # BLD AUTO: 199 10*3/MM3 (ref 140–450)
PMV BLD AUTO: 10.9 FL (ref 6–12)
POTASSIUM SERPL-SCNC: 4.3 MMOL/L (ref 3.5–5.2)
PROT SERPL-MCNC: 6.8 G/DL (ref 6–8.5)
RBC # BLD AUTO: 5.11 10*6/MM3 (ref 4.14–5.8)
SODIUM SERPL-SCNC: 139 MMOL/L (ref 136–145)
WBC NRBC COR # BLD: 7.62 10*3/MM3 (ref 3.4–10.8)

## 2022-08-17 PROCEDURE — 87205 SMEAR GRAM STAIN: CPT | Performed by: INTERNAL MEDICINE

## 2022-08-17 PROCEDURE — 96365 THER/PROPH/DIAG IV INF INIT: CPT

## 2022-08-17 PROCEDURE — 86140 C-REACTIVE PROTEIN: CPT | Performed by: INTERNAL MEDICINE

## 2022-08-17 PROCEDURE — 85025 COMPLETE CBC W/AUTO DIFF WBC: CPT | Performed by: INTERNAL MEDICINE

## 2022-08-17 PROCEDURE — 87070 CULTURE OTHR SPECIMN AEROBIC: CPT | Performed by: INTERNAL MEDICINE

## 2022-08-17 PROCEDURE — 25010000002 DALBAVANCIN 500 MG RECONSTITUTED SOLUTION 1 EACH VIAL: Performed by: INTERNAL MEDICINE

## 2022-08-17 PROCEDURE — 80053 COMPREHEN METABOLIC PANEL: CPT | Performed by: INTERNAL MEDICINE

## 2022-08-17 RX ORDER — DEXTROSE MONOHYDRATE 50 MG/ML
50 INJECTION, SOLUTION INTRAVENOUS ONCE
Start: 2022-08-17 | End: 2022-08-17

## 2022-08-17 RX ORDER — DEXTROSE MONOHYDRATE 50 MG/ML
50 INJECTION, SOLUTION INTRAVENOUS ONCE
Status: COMPLETED | OUTPATIENT
Start: 2022-08-17 | End: 2022-08-17

## 2022-08-17 RX ADMIN — DALBAVANCIN 1500 MG: 500 INJECTION, POWDER, FOR SOLUTION INTRAVENOUS at 09:31

## 2022-08-17 RX ADMIN — DEXTROSE MONOHYDRATE 50 ML/HR: 50 INJECTION, SOLUTION INTRAVENOUS at 09:31

## 2022-08-20 LAB
BACTERIA SPEC AEROBE CULT: NORMAL
GRAM STN SPEC: NORMAL
GRAM STN SPEC: NORMAL

## 2022-12-13 ENCOUNTER — TRANSCRIBE ORDERS (OUTPATIENT)
Dept: ADMINISTRATIVE | Facility: HOSPITAL | Age: 57
End: 2022-12-13

## 2022-12-13 DIAGNOSIS — R10.9 ABDOMINAL PAIN, UNSPECIFIED ABDOMINAL LOCATION: Primary | ICD-10-CM

## 2022-12-22 ENCOUNTER — HOSPITAL ENCOUNTER (OUTPATIENT)
Dept: GENERAL RADIOLOGY | Facility: HOSPITAL | Age: 57
Discharge: HOME | End: 2022-12-22

## 2022-12-22 ENCOUNTER — HOSPITAL ENCOUNTER (OUTPATIENT)
Dept: CT IMAGING | Facility: HOSPITAL | Age: 57
Discharge: HOME | End: 2022-12-22

## 2022-12-22 ENCOUNTER — TRANSCRIBE ORDERS (OUTPATIENT)
Dept: ADMINISTRATIVE | Facility: HOSPITAL | Age: 57
End: 2022-12-22

## 2022-12-22 DIAGNOSIS — M54.50 LOW BACK PAIN, UNSPECIFIED BACK PAIN LATERALITY, UNSPECIFIED CHRONICITY, UNSPECIFIED WHETHER SCIATICA PRESENT: ICD-10-CM

## 2022-12-22 DIAGNOSIS — M54.6 PAIN IN THORACIC SPINE: ICD-10-CM

## 2022-12-22 DIAGNOSIS — M54.12 CERVICAL RADICULITIS: ICD-10-CM

## 2022-12-22 DIAGNOSIS — R10.9 ABDOMINAL PAIN, UNSPECIFIED ABDOMINAL LOCATION: ICD-10-CM

## 2022-12-22 DIAGNOSIS — M54.50 LOW BACK PAIN, UNSPECIFIED BACK PAIN LATERALITY, UNSPECIFIED CHRONICITY, UNSPECIFIED WHETHER SCIATICA PRESENT: Primary | ICD-10-CM

## 2022-12-22 PROCEDURE — 74176 CT ABD & PELVIS W/O CONTRAST: CPT

## 2022-12-22 PROCEDURE — 72050 X-RAY EXAM NECK SPINE 4/5VWS: CPT

## 2022-12-22 PROCEDURE — 72110 X-RAY EXAM L-2 SPINE 4/>VWS: CPT

## 2022-12-22 PROCEDURE — 72050 X-RAY EXAM NECK SPINE 4/5VWS: CPT | Performed by: RADIOLOGY

## 2022-12-22 PROCEDURE — 74176 CT ABD & PELVIS W/O CONTRAST: CPT | Performed by: RADIOLOGY

## 2022-12-22 PROCEDURE — 72110 X-RAY EXAM L-2 SPINE 4/>VWS: CPT | Performed by: RADIOLOGY

## 2022-12-22 PROCEDURE — 72072 X-RAY EXAM THORAC SPINE 3VWS: CPT

## 2022-12-22 PROCEDURE — 72072 X-RAY EXAM THORAC SPINE 3VWS: CPT | Performed by: RADIOLOGY

## 2023-11-10 ENCOUNTER — TRANSCRIBE ORDERS (OUTPATIENT)
Dept: ADMINISTRATIVE | Facility: HOSPITAL | Age: 58
End: 2023-11-10
Payer: MEDICAID

## 2023-11-10 DIAGNOSIS — M54.12 CERVICAL RADICULOPATHY: Primary | ICD-10-CM

## 2023-11-19 ENCOUNTER — HOSPITAL ENCOUNTER (OUTPATIENT)
Dept: MRI IMAGING | Facility: HOSPITAL | Age: 58
Discharge: HOME | End: 2023-11-19
Admitting: INTERNAL MEDICINE
Payer: MEDICAID

## 2023-11-19 DIAGNOSIS — M54.12 CERVICAL RADICULOPATHY: ICD-10-CM

## 2023-11-19 PROCEDURE — 72141 MRI NECK SPINE W/O DYE: CPT

## 2023-12-11 ENCOUNTER — OFFICE VISIT (OUTPATIENT)
Dept: NEUROSURGERY | Facility: CLINIC | Age: 58
End: 2023-12-11
Payer: MEDICAID

## 2023-12-11 VITALS — BODY MASS INDEX: 27.2 KG/M2 | WEIGHT: 205.2 LBS | HEIGHT: 73 IN | RESPIRATION RATE: 17 BRPM

## 2023-12-11 DIAGNOSIS — M50.30 DDD (DEGENERATIVE DISC DISEASE), CERVICAL: ICD-10-CM

## 2023-12-11 DIAGNOSIS — M25.512 CHRONIC LEFT SHOULDER PAIN: ICD-10-CM

## 2023-12-11 DIAGNOSIS — M48.02 SPINAL STENOSIS IN CERVICAL REGION: Primary | ICD-10-CM

## 2023-12-11 DIAGNOSIS — Z72.0 TOBACCO ABUSE: ICD-10-CM

## 2023-12-11 DIAGNOSIS — G89.29 CHRONIC LEFT SHOULDER PAIN: ICD-10-CM

## 2023-12-11 PROCEDURE — 99214 OFFICE O/P EST MOD 30 MIN: CPT | Performed by: NEUROLOGICAL SURGERY

## 2023-12-11 NOTE — PROGRESS NOTES
NAME: GRISELDA KNOTT   DOS: 2023  : 1965  PCP: Lorin Deras MD    Chief Complaint:    Chief Complaint   Patient presents with    Neck & bilateral arm pain     Left side greater         History of Present Illness:  58 y.o. 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  He had significant canal stenosis has a chronic history of cervical thoracic and lumbar spinal pain and had central canal stenosis that was moderate to severe in intensity.  He is still a daily smoker.  He rhe presents with a history of increasing left arm pain he denies clumsiness or symptoms of cervical myelopathy but does occasionally have symptoms to the bilateral arms    He had a prior MRI of his left shoulder that did disclose the presence of a labral tear and he is here for evaluation he has had injections but not any rhizotomies    PMHX  Allergies:  Allergies   Allergen Reactions    Codeine Shortness Of Breath     Medications    Current Outpatient Medications:     AndroGel Pump 20.25 MG/ACT (1.62%) gel, Out of med for last 3 weeks, Disp: , Rfl:     Juluca 50-25 MG per tablet, Take 1 tablet by mouth Daily., Disp: , Rfl:     loperamide (IMODIUM) 2 MG capsule, Take 2 mg by mouth As Needed for Diarrhea., Disp: , Rfl:     LORazepam (ATIVAN) 1 MG tablet, Take 1 mg by mouth Every 12 (Twelve) Hours As Needed., Disp: , Rfl:     methocarbamol (ROBAXIN) 750 MG tablet, Take 1 tablet by mouth 4 (Four) Times a Day As Needed for Muscle Spasms., Disp: 90 tablet, Rfl: 0    pregabalin (LYRICA) 75 MG capsule, TAKE ONE CAPSULE BY MOUTH TWICE A DAY FOR PAIN (Patient taking differently: Take 75 mg by mouth 2 (Two) Times a Day.), Disp: 60 capsule, Rfl: 1    tadalafil (CIALIS) 5 MG tablet, Take 2.5 mg by mouth Daily., Disp: , Rfl:     Testosterone Cypionate (DEPOTESTOTERONE CYPIONATE) 200 MG/ML injection, Inject  200 mg into the appropriate muscle as directed by prescriber Every 14 (Fourteen) Days. 2 TIMES  PER MONTH, Disp: , Rfl:     traMADol (ULTRAM) 50 MG tablet, Take 50 mg by mouth 2 (two) times a day. for pain, Disp: , Rfl:     traMADol (ULTRAM) 50 MG tablet, Take 1 tablet by mouth Every 6 (Six) Hours As Needed for Moderate Pain ., Disp: 50 tablet, Rfl: 0  Past Medical History:  Past Medical History:   Diagnosis Date    AIDS (acquired immune deficiency syndrome)     Anxiety     HIV (human immunodeficiency virus infection)     Sleep apnea     no c-pap     Past Surgical History:  Past Surgical History:   Procedure Laterality Date    ANTERIOR CERVICAL DISCECTOMY W/ FUSION Left 11/3/2021    Procedure: CERVICAL DISCECTOMY ANTERIOR WITH FUSION C6-7 LEFT;  Surgeon: Roldan Murphy MD;  Location: [facility];  Service: Neurosurgery;  Laterality: Left;    APPENDECTOMY      COLONOSCOPY  2020    EYE SURGERY Bilateral     lasik    GALLBLADDER SURGERY       Social Hx:  Social History     Tobacco Use    Smoking status: Every Day     Packs/day: 0.50     Years: 40.00     Additional pack years: 0.00     Total pack years: 20.00     Types: Cigarettes    Smokeless tobacco: Never   Vaping Use    Vaping Use: Never used   Substance Use Topics    Alcohol use: Yes     Comment: 2 drinks per week    Drug use: Yes     Types: Marijuana     Comment: avg deepak every 3 weeks, last use over 1 month ago     Family Hx:  Family History   Problem Relation Age of Onset    Heart disease Mother     Diabetes Sister     Hypertension Sister      Review of Systems:        Review of Systems   Constitutional:  Negative for activity change, appetite change, chills, diaphoresis, fatigue, fever and unexpected weight change.   HENT:  Negative for congestion, dental problem, drooling, ear discharge, ear pain, facial swelling, hearing loss, mouth sores, nosebleeds, postnasal drip, rhinorrhea, sinus pressure, sneezing, sore throat, tinnitus, trouble swallowing and voice  change.    Eyes:  Negative for photophobia, pain, discharge, redness, itching and visual disturbance.   Respiratory:  Negative for apnea, cough, choking, chest tightness, shortness of breath, wheezing and stridor.    Cardiovascular:  Negative for chest pain, palpitations and leg swelling.   Gastrointestinal:  Negative for abdominal distention, abdominal pain, anal bleeding, blood in stool, constipation, diarrhea, nausea, rectal pain and vomiting.   Endocrine: Negative for cold intolerance, heat intolerance, polydipsia, polyphagia and polyuria.   Genitourinary:  Negative for decreased urine volume, difficulty urinating, dysuria, enuresis, flank pain, frequency, genital sores, hematuria and urgency.   Musculoskeletal:  Positive for neck pain. Negative for arthralgias, back pain, gait problem, joint swelling, myalgias and neck stiffness.   Skin:  Negative for color change, pallor, rash and wound.   Allergic/Immunologic: Negative for environmental allergies, food allergies and immunocompromised state.   Neurological:  Negative for dizziness, tremors, seizures, syncope, facial asymmetry, speech difficulty, weakness, light-headedness, numbness and headaches.   Hematological:  Negative for adenopathy. Does not bruise/bleed easily.   Psychiatric/Behavioral:  Negative for agitation, behavioral problems, confusion, decreased concentration, dysphoric mood, hallucinations, self-injury, sleep disturbance and suicidal ideas. The patient is not nervous/anxious and is not hyperactive.    All other systems reviewed and are negative.     I have reviewed this note template and all pertinent parts of the review of systems social, family history, surgical history and medication list      Physical Examination:  Vitals:    12/11/23 1437   Resp: 17      General Appearance:   Well developed, well nourished, well groomed, alert, and cooperative.  Neurological examination:  Neurologic Exam  Vital signs were reviewed and documented in the  chart  Patient appeared in good neurologic function with normal comprehension fluent speech  Mood and affect are normal  Sense of smell deferred    Pupils symmetric equally reactive funduscopic exam not visualized   Visual fields intact to confrontation  Extraocular movements intact  Face motor function is symmetric  Facial sensations normal  Hearing intact to finger rub hearing intact to finger rub  Tongue is midline  Palate symmetric  Swallowing normal  Shoulder shrug normal  His neck incisions pristine  Muscle bulk and tone normal  5 out of 5 strength no motor drift, he has effort dependent pain in his left shoulder and signs and symptoms of intrinsic shoulder dysfunction without winging of the scapula  Gait normal intact  Negative Romberg  No clonus long tract signs or myelopathy    Reflexes symmetrically absent  No edema noted and extremities skin appears normal          Review of Imaging/DATA:  I personally reviewed and interpreted MRI of the cervical spine I compared it to multiple prior MRIs and CT scans the prior ACDF appears good at C6-7 he is well decompressed at the level all the other levels appear mostly stable he has multilevel degenerative disc at C3-4 C4-5 and C5-6 predominantly at the C4-5 and 5 6 areas    CT scans demonstrate that most of these are bony osteophytes cervical flexion-extension films previously demonstrate no evidence of any listhesis  Diagnoses/Plan:    Mr. Bueno is a 58 y.o. male   1.  Multilevel cervical degenerative disc disease  2.  Status post C6-7 ACDF for left arm pain-well surgically decompressed-he feels better from this however has had return of neck pain and intermittent arm pain more on the left  3.  Left-sided labral tear history with complex left shoulder dysfunction    4.  Central canal stenosis multiple levels of disc osteophyte with cord contouring but no evidence of STIR signal change and certainly clinically no evidence of myelopathy findings    I explained the  risk benefits and expected outcome of major elective surgery for their problem, complications from approach, and infection, the risk of neurologic implications after surgery as well as need for repeat surgeries and most importantly failure to achieve quality of life improvement from the surgery to the patient.  I talked him about the difference between a single level and multilevel surgeries-it is a complex interplay of complications and downtime explained the role for front and back surgeries etc. and explained that in his situation with the degrees of osteophytes that he has I would probably wait and attempt conservative-ism.    I counseled him on fall risk    I explained the importance of smoking cessation to the patient explaining that smoking decreases the efficacy of surgical therapies not to mention the general health risks.  In addition to this when contemplating fusion surgeries smoking decreases fusion rates and leads to poor outcome, and contributes to complication rate.  I was explicit about this    Plan    1.  Physical therapy    2.  I am very good with a rhizotomy cervical  plan    3.  He is here to see me in the spring with a cervical flexion-extension to track his progress    4.  The gentleman needs a referral to a orthopedic shoulder specialist to make sure that he does not have a full-thickness tear now given some of his shoulder pain that is reproducible with movement    5.  He may be a candidate for multilevel anterior cervical discectomy and fusion should his symptoms persist I suspect that anterior approach would allow for restore of lordosis and intraforaminal decompression

## 2024-02-26 ENCOUNTER — HOSPITAL ENCOUNTER (OUTPATIENT)
Dept: GENERAL RADIOLOGY | Facility: HOSPITAL | Age: 59
Discharge: HOME | End: 2024-02-26
Admitting: INTERNAL MEDICINE
Payer: MEDICAID

## 2024-02-26 ENCOUNTER — TRANSCRIBE ORDERS (OUTPATIENT)
Dept: ADMINISTRATIVE | Facility: HOSPITAL | Age: 59
End: 2024-02-26
Payer: MEDICAID

## 2024-02-26 DIAGNOSIS — M48.02 SPINAL STENOSIS IN CERVICAL REGION: Primary | ICD-10-CM

## 2024-02-26 DIAGNOSIS — M48.02 SPINAL STENOSIS IN CERVICAL REGION: ICD-10-CM

## 2024-02-26 PROCEDURE — 72110 X-RAY EXAM L-2 SPINE 4/>VWS: CPT

## 2024-02-26 PROCEDURE — 72110 X-RAY EXAM L-2 SPINE 4/>VWS: CPT | Performed by: RADIOLOGY

## 2024-02-26 PROCEDURE — 72072 X-RAY EXAM THORAC SPINE 3VWS: CPT

## 2024-02-26 PROCEDURE — 72072 X-RAY EXAM THORAC SPINE 3VWS: CPT | Performed by: RADIOLOGY

## 2024-09-11 ENCOUNTER — TRANSCRIBE ORDERS (OUTPATIENT)
Dept: ADMINISTRATIVE | Facility: HOSPITAL | Age: 59
End: 2024-09-11
Payer: MEDICAID

## 2024-09-11 DIAGNOSIS — M54.2 CERVICALGIA: Primary | ICD-10-CM

## 2024-09-16 ENCOUNTER — TRANSCRIBE ORDERS (OUTPATIENT)
Dept: ADMINISTRATIVE | Facility: HOSPITAL | Age: 59
End: 2024-09-16
Payer: MEDICAID

## 2024-09-16 DIAGNOSIS — S43.432A TEAR OF LEFT GLENOID LABRUM, INITIAL ENCOUNTER: Primary | ICD-10-CM

## 2024-11-11 ENCOUNTER — APPOINTMENT (OUTPATIENT)
Dept: CT IMAGING | Facility: HOSPITAL | Age: 59
DRG: 439 | End: 2024-11-11
Payer: MEDICAID

## 2024-11-11 ENCOUNTER — HOSPITAL ENCOUNTER (INPATIENT)
Facility: HOSPITAL | Age: 59
LOS: 3 days | Discharge: HOME | DRG: 439 | End: 2024-11-15
Attending: STUDENT IN AN ORGANIZED HEALTH CARE EDUCATION/TRAINING PROGRAM | Admitting: INTERNAL MEDICINE
Payer: MEDICAID

## 2024-11-11 DIAGNOSIS — K85.90 ACUTE PANCREATITIS, UNSPECIFIED COMPLICATION STATUS, UNSPECIFIED PANCREATITIS TYPE: Primary | ICD-10-CM

## 2024-11-11 LAB
ALBUMIN SERPL-MCNC: 4.8 G/DL (ref 3.5–5.2)
ALBUMIN/GLOB SERPL: 1.8 G/DL
ALP SERPL-CCNC: 64 U/L (ref 39–117)
ALT SERPL W P-5'-P-CCNC: 23 U/L (ref 1–41)
ANION GAP SERPL CALCULATED.3IONS-SCNC: 12.5 MMOL/L (ref 5–15)
AST SERPL-CCNC: 21 U/L (ref 1–40)
BASOPHILS # BLD AUTO: 0.03 10*3/MM3 (ref 0–0.2)
BASOPHILS NFR BLD AUTO: 0.2 % (ref 0–1.5)
BILIRUB SERPL-MCNC: 0.4 MG/DL (ref 0–1.2)
BUN SERPL-MCNC: 15 MG/DL (ref 6–20)
BUN/CREAT SERPL: 13.3 (ref 7–25)
CALCIUM SPEC-SCNC: 10.7 MG/DL (ref 8.6–10.5)
CHLORIDE SERPL-SCNC: 104 MMOL/L (ref 98–107)
CO2 SERPL-SCNC: 25.5 MMOL/L (ref 22–29)
CREAT SERPL-MCNC: 1.13 MG/DL (ref 0.76–1.27)
D-LACTATE SERPL-SCNC: 1.8 MMOL/L (ref 0.5–2)
DEPRECATED RDW RBC AUTO: 58.6 FL (ref 37–54)
EGFRCR SERPLBLD CKD-EPI 2021: 74.9 ML/MIN/1.73
EOSINOPHIL # BLD AUTO: 0.03 10*3/MM3 (ref 0–0.4)
EOSINOPHIL NFR BLD AUTO: 0.2 % (ref 0.3–6.2)
ERYTHROCYTE [DISTWIDTH] IN BLOOD BY AUTOMATED COUNT: 15.9 % (ref 12.3–15.4)
GLOBULIN UR ELPH-MCNC: 2.6 GM/DL
GLUCOSE BLDC GLUCOMTR-MCNC: 116 MG/DL (ref 70–130)
GLUCOSE SERPL-MCNC: 120 MG/DL (ref 65–99)
HCT VFR BLD AUTO: 54.9 % (ref 37.5–51)
HGB BLD-MCNC: 17.5 G/DL (ref 13–17.7)
HOLD SPECIMEN: NORMAL
HOLD SPECIMEN: NORMAL
IMM GRANULOCYTES # BLD AUTO: 0.11 10*3/MM3 (ref 0–0.05)
IMM GRANULOCYTES NFR BLD AUTO: 0.8 % (ref 0–0.5)
LIPASE SERPL-CCNC: >3000 U/L (ref 13–60)
LYMPHOCYTES # BLD AUTO: 3.17 10*3/MM3 (ref 0.7–3.1)
LYMPHOCYTES NFR BLD AUTO: 21.8 % (ref 19.6–45.3)
MCH RBC QN AUTO: 31.8 PG (ref 26.6–33)
MCHC RBC AUTO-ENTMCNC: 31.9 G/DL (ref 31.5–35.7)
MCV RBC AUTO: 99.8 FL (ref 79–97)
MONOCYTES # BLD AUTO: 0.88 10*3/MM3 (ref 0.1–0.9)
MONOCYTES NFR BLD AUTO: 6 % (ref 5–12)
NEUTROPHILS NFR BLD AUTO: 10.33 10*3/MM3 (ref 1.7–7)
NEUTROPHILS NFR BLD AUTO: 71 % (ref 42.7–76)
NRBC BLD AUTO-RTO: 0 /100 WBC (ref 0–0.2)
PLATELET # BLD AUTO: 232 10*3/MM3 (ref 140–450)
PMV BLD AUTO: 10.4 FL (ref 6–12)
POTASSIUM SERPL-SCNC: 4 MMOL/L (ref 3.5–5.2)
PROT SERPL-MCNC: 7.4 G/DL (ref 6–8.5)
RBC # BLD AUTO: 5.5 10*6/MM3 (ref 4.14–5.8)
SODIUM SERPL-SCNC: 142 MMOL/L (ref 136–145)
WBC NRBC COR # BLD AUTO: 14.55 10*3/MM3 (ref 3.4–10.8)
WHOLE BLOOD HOLD COAG: NORMAL
WHOLE BLOOD HOLD SPECIMEN: NORMAL

## 2024-11-11 PROCEDURE — 82077 ASSAY SPEC XCP UR&BREATH IA: CPT | Performed by: STUDENT IN AN ORGANIZED HEALTH CARE EDUCATION/TRAINING PROGRAM

## 2024-11-11 PROCEDURE — 74177 CT ABD & PELVIS W/CONTRAST: CPT

## 2024-11-11 PROCEDURE — 83605 ASSAY OF LACTIC ACID: CPT | Performed by: STUDENT IN AN ORGANIZED HEALTH CARE EDUCATION/TRAINING PROGRAM

## 2024-11-11 PROCEDURE — 82948 REAGENT STRIP/BLOOD GLUCOSE: CPT

## 2024-11-11 PROCEDURE — 83690 ASSAY OF LIPASE: CPT | Performed by: STUDENT IN AN ORGANIZED HEALTH CARE EDUCATION/TRAINING PROGRAM

## 2024-11-11 PROCEDURE — 36415 COLL VENOUS BLD VENIPUNCTURE: CPT | Performed by: STUDENT IN AN ORGANIZED HEALTH CARE EDUCATION/TRAINING PROGRAM

## 2024-11-11 PROCEDURE — 25010000002 HYDROMORPHONE 1 MG/ML SOLUTION: Performed by: STUDENT IN AN ORGANIZED HEALTH CARE EDUCATION/TRAINING PROGRAM

## 2024-11-11 PROCEDURE — 25010000002 ONDANSETRON PER 1 MG: Performed by: STUDENT IN AN ORGANIZED HEALTH CARE EDUCATION/TRAINING PROGRAM

## 2024-11-11 PROCEDURE — 36415 COLL VENOUS BLD VENIPUNCTURE: CPT

## 2024-11-11 PROCEDURE — 99285 EMERGENCY DEPT VISIT HI MDM: CPT

## 2024-11-11 PROCEDURE — 80053 COMPREHEN METABOLIC PANEL: CPT | Performed by: STUDENT IN AN ORGANIZED HEALTH CARE EDUCATION/TRAINING PROGRAM

## 2024-11-11 PROCEDURE — 85025 COMPLETE CBC W/AUTO DIFF WBC: CPT | Performed by: STUDENT IN AN ORGANIZED HEALTH CARE EDUCATION/TRAINING PROGRAM

## 2024-11-11 PROCEDURE — 84478 ASSAY OF TRIGLYCERIDES: CPT | Performed by: INTERNAL MEDICINE

## 2024-11-11 PROCEDURE — 25510000001 IOPAMIDOL 61 % SOLUTION: Performed by: STUDENT IN AN ORGANIZED HEALTH CARE EDUCATION/TRAINING PROGRAM

## 2024-11-11 PROCEDURE — 25010000002 KETOROLAC TROMETHAMINE PER 15 MG: Performed by: STUDENT IN AN ORGANIZED HEALTH CARE EDUCATION/TRAINING PROGRAM

## 2024-11-11 PROCEDURE — 74177 CT ABD & PELVIS W/CONTRAST: CPT | Performed by: RADIOLOGY

## 2024-11-11 RX ORDER — KETOROLAC TROMETHAMINE 30 MG/ML
30 INJECTION, SOLUTION INTRAMUSCULAR; INTRAVENOUS ONCE
Status: COMPLETED | OUTPATIENT
Start: 2024-11-11 | End: 2024-11-11

## 2024-11-11 RX ORDER — PHENOBARBITAL, HYOSCYAMINE SULFATE, ATROPINE SULFATE AND SCOPOLAMINE HYDROBROMIDE .0194; .1037; 16.2; .0065 MG/1; MG/1; MG/1; MG/1
2 TABLET ORAL ONCE
Status: COMPLETED | OUTPATIENT
Start: 2024-11-11 | End: 2024-11-11

## 2024-11-11 RX ORDER — SODIUM CHLORIDE 0.9 % (FLUSH) 0.9 %
10 SYRINGE (ML) INJECTION AS NEEDED
Status: DISCONTINUED | OUTPATIENT
Start: 2024-11-11 | End: 2024-11-15 | Stop reason: HOSPADM

## 2024-11-11 RX ORDER — ONDANSETRON 2 MG/ML
4 INJECTION INTRAMUSCULAR; INTRAVENOUS ONCE
Status: COMPLETED | OUTPATIENT
Start: 2024-11-11 | End: 2024-11-11

## 2024-11-11 RX ORDER — ALUMINA, MAGNESIA, AND SIMETHICONE 2400; 2400; 240 MG/30ML; MG/30ML; MG/30ML
15 SUSPENSION ORAL ONCE
Status: COMPLETED | OUTPATIENT
Start: 2024-11-11 | End: 2024-11-11

## 2024-11-11 RX ORDER — IOPAMIDOL 612 MG/ML
100 INJECTION, SOLUTION INTRAVASCULAR
Status: COMPLETED | OUTPATIENT
Start: 2024-11-11 | End: 2024-11-11

## 2024-11-11 RX ORDER — LIDOCAINE HYDROCHLORIDE 20 MG/ML
15 SOLUTION OROPHARYNGEAL ONCE
Status: COMPLETED | OUTPATIENT
Start: 2024-11-11 | End: 2024-11-11

## 2024-11-11 RX ADMIN — ONDANSETRON 4 MG: 2 INJECTION INTRAMUSCULAR; INTRAVENOUS at 22:49

## 2024-11-11 RX ADMIN — HYDROMORPHONE HYDROCHLORIDE 1 MG: 1 INJECTION, SOLUTION INTRAMUSCULAR; INTRAVENOUS; SUBCUTANEOUS at 22:49

## 2024-11-11 RX ADMIN — LIDOCAINE HYDROCHLORIDE 15 ML: 20 SOLUTION ORAL at 22:49

## 2024-11-11 RX ADMIN — PHENOBARBITAL, HYOSCYAMINE SULFATE, ATROPINE SULFATE, SCOPOLAMINE HYDROBROMIDE 32.4 MG: 16.2; .1037; .0194; .0065 TABLET ORAL at 22:49

## 2024-11-11 RX ADMIN — ALUMINUM HYDROXIDE, MAGNESIUM HYDROXIDE, AND DIMETHICONE 15 ML: 400; 400; 40 SUSPENSION ORAL at 22:49

## 2024-11-11 RX ADMIN — IOPAMIDOL 87 ML: 612 INJECTION, SOLUTION INTRAVENOUS at 23:21

## 2024-11-11 RX ADMIN — KETOROLAC TROMETHAMINE 30 MG: 30 INJECTION, SOLUTION INTRAMUSCULAR; INTRAVENOUS at 22:49

## 2024-11-12 PROBLEM — K85.90 ACUTE PANCREATITIS: Status: ACTIVE | Noted: 2024-11-12

## 2024-11-12 LAB
ALBUMIN SERPL-MCNC: 4.3 G/DL (ref 3.5–5.2)
ALBUMIN/GLOB SERPL: 2 G/DL
ALP SERPL-CCNC: 61 U/L (ref 39–117)
ALT SERPL W P-5'-P-CCNC: 20 U/L (ref 1–41)
AMPHET+METHAMPHET UR QL: NEGATIVE
AMPHETAMINES UR QL: NEGATIVE
ANION GAP SERPL CALCULATED.3IONS-SCNC: 9.7 MMOL/L (ref 5–15)
AST SERPL-CCNC: 18 U/L (ref 1–40)
BACTERIA UR QL AUTO: NORMAL /HPF
BARBITURATES UR QL SCN: NEGATIVE
BASOPHILS # BLD AUTO: 0.02 10*3/MM3 (ref 0–0.2)
BASOPHILS NFR BLD AUTO: 0.1 % (ref 0–1.5)
BENZODIAZ UR QL SCN: POSITIVE
BILIRUB SERPL-MCNC: 0.6 MG/DL (ref 0–1.2)
BILIRUB UR QL STRIP: NEGATIVE
BUN SERPL-MCNC: 16 MG/DL (ref 6–20)
BUN/CREAT SERPL: 14.7 (ref 7–25)
BUPRENORPHINE SERPL-MCNC: NEGATIVE NG/ML
CALCIUM SPEC-SCNC: 9.9 MG/DL (ref 8.6–10.5)
CANNABINOIDS SERPL QL: NEGATIVE
CHLORIDE SERPL-SCNC: 102 MMOL/L (ref 98–107)
CLARITY UR: CLEAR
CO2 SERPL-SCNC: 24.3 MMOL/L (ref 22–29)
COCAINE UR QL: NEGATIVE
COLOR UR: YELLOW
CREAT SERPL-MCNC: 1.09 MG/DL (ref 0.76–1.27)
DEPRECATED RDW RBC AUTO: 60.7 FL (ref 37–54)
EGFRCR SERPLBLD CKD-EPI 2021: 78.2 ML/MIN/1.73
EOSINOPHIL # BLD AUTO: 0.02 10*3/MM3 (ref 0–0.4)
EOSINOPHIL NFR BLD AUTO: 0.1 % (ref 0.3–6.2)
ERYTHROCYTE [DISTWIDTH] IN BLOOD BY AUTOMATED COUNT: 15.9 % (ref 12.3–15.4)
ETHANOL BLD-MCNC: <10 MG/DL (ref 0–10)
ETHANOL UR QL: <0.01 %
FENTANYL UR-MCNC: NEGATIVE NG/ML
GLOBULIN UR ELPH-MCNC: 2.2 GM/DL
GLUCOSE BLDC GLUCOMTR-MCNC: 117 MG/DL (ref 70–130)
GLUCOSE BLDC GLUCOMTR-MCNC: 119 MG/DL (ref 70–130)
GLUCOSE BLDC GLUCOMTR-MCNC: 124 MG/DL (ref 70–130)
GLUCOSE SERPL-MCNC: 125 MG/DL (ref 65–99)
GLUCOSE UR STRIP-MCNC: NEGATIVE MG/DL
HCT VFR BLD AUTO: 50.8 % (ref 37.5–51)
HGB BLD-MCNC: 16.3 G/DL (ref 13–17.7)
HGB UR QL STRIP.AUTO: NEGATIVE
HYALINE CASTS UR QL AUTO: NORMAL /LPF
IMM GRANULOCYTES # BLD AUTO: 0.1 10*3/MM3 (ref 0–0.05)
IMM GRANULOCYTES NFR BLD AUTO: 0.7 % (ref 0–0.5)
KETONES UR QL STRIP: NEGATIVE
LEUKOCYTE ESTERASE UR QL STRIP.AUTO: NEGATIVE
LYMPHOCYTES # BLD AUTO: 1.8 10*3/MM3 (ref 0.7–3.1)
LYMPHOCYTES NFR BLD AUTO: 13 % (ref 19.6–45.3)
MCH RBC QN AUTO: 32.7 PG (ref 26.6–33)
MCHC RBC AUTO-ENTMCNC: 32.1 G/DL (ref 31.5–35.7)
MCV RBC AUTO: 101.8 FL (ref 79–97)
METHADONE UR QL SCN: NEGATIVE
MONOCYTES # BLD AUTO: 1.05 10*3/MM3 (ref 0.1–0.9)
MONOCYTES NFR BLD AUTO: 7.6 % (ref 5–12)
NEUTROPHILS NFR BLD AUTO: 10.88 10*3/MM3 (ref 1.7–7)
NEUTROPHILS NFR BLD AUTO: 78.5 % (ref 42.7–76)
NITRITE UR QL STRIP: NEGATIVE
NRBC BLD AUTO-RTO: 0 /100 WBC (ref 0–0.2)
OPIATES UR QL: POSITIVE
OXYCODONE UR QL SCN: NEGATIVE
PCP UR QL SCN: NEGATIVE
PH UR STRIP.AUTO: <=5 [PH] (ref 5–8)
PLATELET # BLD AUTO: 187 10*3/MM3 (ref 140–450)
PMV BLD AUTO: 10.5 FL (ref 6–12)
POTASSIUM SERPL-SCNC: 4.8 MMOL/L (ref 3.5–5.2)
PROT SERPL-MCNC: 6.5 G/DL (ref 6–8.5)
PROT UR QL STRIP: ABNORMAL
QT INTERVAL: 374 MS
QT INTERVAL: 402 MS
RBC # BLD AUTO: 4.99 10*6/MM3 (ref 4.14–5.8)
RBC # UR STRIP: NORMAL /HPF
REF LAB TEST METHOD: NORMAL
SODIUM SERPL-SCNC: 136 MMOL/L (ref 136–145)
SP GR UR STRIP: >1.03 (ref 1–1.03)
SQUAMOUS #/AREA URNS HPF: NORMAL /HPF
TRICYCLICS UR QL SCN: NEGATIVE
TRIGL SERPL-MCNC: 338 MG/DL (ref 0–150)
UROBILINOGEN UR QL STRIP: ABNORMAL
WBC # UR STRIP: NORMAL /HPF
WBC NRBC COR # BLD AUTO: 13.87 10*3/MM3 (ref 3.4–10.8)

## 2024-11-12 PROCEDURE — 25010000002 HYDROMORPHONE PER 4 MG

## 2024-11-12 PROCEDURE — 80307 DRUG TEST PRSMV CHEM ANLYZR: CPT | Performed by: INTERNAL MEDICINE

## 2024-11-12 PROCEDURE — 25010000002 HYDROMORPHONE 1 MG/ML SOLUTION: Performed by: STUDENT IN AN ORGANIZED HEALTH CARE EDUCATION/TRAINING PROGRAM

## 2024-11-12 PROCEDURE — 25010000002 HEPARIN (PORCINE) PER 1000 UNITS: Performed by: INTERNAL MEDICINE

## 2024-11-12 PROCEDURE — 25010000002 HYDROMORPHONE PER 4 MG: Performed by: INTERNAL MEDICINE

## 2024-11-12 PROCEDURE — 25810000003 SODIUM CHLORIDE 0.9 % SOLUTION: Performed by: INTERNAL MEDICINE

## 2024-11-12 PROCEDURE — 82948 REAGENT STRIP/BLOOD GLUCOSE: CPT

## 2024-11-12 PROCEDURE — 93010 ELECTROCARDIOGRAM REPORT: CPT | Performed by: INTERNAL MEDICINE

## 2024-11-12 PROCEDURE — 99223 1ST HOSP IP/OBS HIGH 75: CPT

## 2024-11-12 PROCEDURE — 25010000002 THIAMINE HCL 200 MG/2ML SOLUTION: Performed by: STUDENT IN AN ORGANIZED HEALTH CARE EDUCATION/TRAINING PROGRAM

## 2024-11-12 PROCEDURE — 25010000002 THIAMINE PER 100 MG: Performed by: STUDENT IN AN ORGANIZED HEALTH CARE EDUCATION/TRAINING PROGRAM

## 2024-11-12 PROCEDURE — 85025 COMPLETE CBC W/AUTO DIFF WBC: CPT | Performed by: INTERNAL MEDICINE

## 2024-11-12 PROCEDURE — 80053 COMPREHEN METABOLIC PANEL: CPT | Performed by: INTERNAL MEDICINE

## 2024-11-12 PROCEDURE — 93005 ELECTROCARDIOGRAM TRACING: CPT | Performed by: STUDENT IN AN ORGANIZED HEALTH CARE EDUCATION/TRAINING PROGRAM

## 2024-11-12 PROCEDURE — 25010000002 HYDROMORPHONE PER 4 MG: Performed by: STUDENT IN AN ORGANIZED HEALTH CARE EDUCATION/TRAINING PROGRAM

## 2024-11-12 PROCEDURE — 93005 ELECTROCARDIOGRAM TRACING: CPT | Performed by: INTERNAL MEDICINE

## 2024-11-12 PROCEDURE — 81001 URINALYSIS AUTO W/SCOPE: CPT | Performed by: STUDENT IN AN ORGANIZED HEALTH CARE EDUCATION/TRAINING PROGRAM

## 2024-11-12 PROCEDURE — 25010000002 ONDANSETRON PER 1 MG: Performed by: INTERNAL MEDICINE

## 2024-11-12 RX ORDER — SODIUM CHLORIDE 9 MG/ML
100 INJECTION, SOLUTION INTRAVENOUS CONTINUOUS
Status: ACTIVE | OUTPATIENT
Start: 2024-11-12 | End: 2024-11-12

## 2024-11-12 RX ORDER — TRIAMCINOLONE ACETONIDE 1 MG/G
1 CREAM TOPICAL 2 TIMES DAILY
COMMUNITY

## 2024-11-12 RX ORDER — HEPARIN SODIUM 5000 [USP'U]/ML
5000 INJECTION, SOLUTION INTRAVENOUS; SUBCUTANEOUS EVERY 12 HOURS SCHEDULED
Status: DISCONTINUED | OUTPATIENT
Start: 2024-11-12 | End: 2024-11-15 | Stop reason: HOSPADM

## 2024-11-12 RX ORDER — FINASTERIDE 5 MG/1
5 TABLET, FILM COATED ORAL DAILY
Status: DISCONTINUED | OUTPATIENT
Start: 2024-11-12 | End: 2024-11-15 | Stop reason: HOSPADM

## 2024-11-12 RX ORDER — LORAZEPAM 2 MG/ML
2 INJECTION INTRAMUSCULAR
Status: DISCONTINUED | OUTPATIENT
Start: 2024-11-12 | End: 2024-11-15 | Stop reason: HOSPADM

## 2024-11-12 RX ORDER — LORAZEPAM 2 MG/ML
1 INJECTION INTRAMUSCULAR
Status: DISCONTINUED | OUTPATIENT
Start: 2024-11-12 | End: 2024-11-15 | Stop reason: HOSPADM

## 2024-11-12 RX ORDER — DEXTROSE MONOHYDRATE 25 G/50ML
25 INJECTION, SOLUTION INTRAVENOUS
Status: DISCONTINUED | OUTPATIENT
Start: 2024-11-12 | End: 2024-11-15 | Stop reason: HOSPADM

## 2024-11-12 RX ORDER — SODIUM CHLORIDE 0.9 % (FLUSH) 0.9 %
10 SYRINGE (ML) INJECTION AS NEEDED
Status: DISCONTINUED | OUTPATIENT
Start: 2024-11-12 | End: 2024-11-15 | Stop reason: HOSPADM

## 2024-11-12 RX ORDER — BUPROPION HYDROCHLORIDE 300 MG/1
300 TABLET ORAL DAILY
COMMUNITY

## 2024-11-12 RX ORDER — LORAZEPAM 1 MG/1
1 TABLET ORAL EVERY 8 HOURS PRN
Status: DISCONTINUED | OUTPATIENT
Start: 2024-11-12 | End: 2024-11-15 | Stop reason: HOSPADM

## 2024-11-12 RX ORDER — HYDROMORPHONE HYDROCHLORIDE 1 MG/ML
0.5 INJECTION, SOLUTION INTRAMUSCULAR; INTRAVENOUS; SUBCUTANEOUS
Status: DISCONTINUED | OUTPATIENT
Start: 2024-11-12 | End: 2024-11-12

## 2024-11-12 RX ORDER — CHOLESTYRAMINE 4 G/9G
1 POWDER, FOR SUSPENSION ORAL EVERY 12 HOURS SCHEDULED
Status: DISCONTINUED | OUTPATIENT
Start: 2024-11-12 | End: 2024-11-15 | Stop reason: HOSPADM

## 2024-11-12 RX ORDER — BISACODYL 5 MG/1
5 TABLET, DELAYED RELEASE ORAL DAILY PRN
Status: DISCONTINUED | OUTPATIENT
Start: 2024-11-12 | End: 2024-11-15 | Stop reason: HOSPADM

## 2024-11-12 RX ORDER — COLESEVELAM HYDROCHLORIDE 3.75 G/1
3.75 POWDER, FOR SUSPENSION ORAL DAILY
COMMUNITY

## 2024-11-12 RX ORDER — BREXPIPRAZOLE 1 MG/1
1 TABLET ORAL DAILY
COMMUNITY

## 2024-11-12 RX ORDER — HYDROMORPHONE HYDROCHLORIDE 1 MG/ML
0.25 INJECTION, SOLUTION INTRAMUSCULAR; INTRAVENOUS; SUBCUTANEOUS
Status: DISCONTINUED | OUTPATIENT
Start: 2024-11-12 | End: 2024-11-15 | Stop reason: HOSPADM

## 2024-11-12 RX ORDER — TRIAMCINOLONE ACETONIDE 1 MG/G
1 CREAM TOPICAL 2 TIMES DAILY
Status: DISCONTINUED | OUTPATIENT
Start: 2024-11-12 | End: 2024-11-15 | Stop reason: HOSPADM

## 2024-11-12 RX ORDER — BISACODYL 10 MG
10 SUPPOSITORY, RECTAL RECTAL DAILY PRN
Status: DISCONTINUED | OUTPATIENT
Start: 2024-11-12 | End: 2024-11-15 | Stop reason: HOSPADM

## 2024-11-12 RX ORDER — AMOXICILLIN 250 MG
2 CAPSULE ORAL 2 TIMES DAILY PRN
Status: DISCONTINUED | OUTPATIENT
Start: 2024-11-12 | End: 2024-11-15 | Stop reason: HOSPADM

## 2024-11-12 RX ORDER — LORAZEPAM 2 MG/1
2 TABLET ORAL
Status: DISCONTINUED | OUTPATIENT
Start: 2024-11-12 | End: 2024-11-15 | Stop reason: HOSPADM

## 2024-11-12 RX ORDER — DORAVIRINE 100 MG/1
100 TABLET, FILM COATED ORAL DAILY
COMMUNITY

## 2024-11-12 RX ORDER — ONDANSETRON 2 MG/ML
4 INJECTION INTRAMUSCULAR; INTRAVENOUS EVERY 6 HOURS PRN
Status: DISCONTINUED | OUTPATIENT
Start: 2024-11-12 | End: 2024-11-15 | Stop reason: HOSPADM

## 2024-11-12 RX ORDER — SODIUM CHLORIDE 0.9 % (FLUSH) 0.9 %
10 SYRINGE (ML) INJECTION EVERY 12 HOURS SCHEDULED
Status: DISCONTINUED | OUTPATIENT
Start: 2024-11-12 | End: 2024-11-15 | Stop reason: HOSPADM

## 2024-11-12 RX ORDER — NICOTINE POLACRILEX 4 MG
15 LOZENGE BUCCAL
Status: DISCONTINUED | OUTPATIENT
Start: 2024-11-12 | End: 2024-11-15 | Stop reason: HOSPADM

## 2024-11-12 RX ORDER — TESTOSTERONE 20.25 MG/1.25G
2 GEL TOPICAL DAILY
Status: DISCONTINUED | OUTPATIENT
Start: 2024-11-12 | End: 2024-11-15 | Stop reason: HOSPADM

## 2024-11-12 RX ORDER — SODIUM CHLORIDE 9 MG/ML
40 INJECTION, SOLUTION INTRAVENOUS AS NEEDED
Status: DISCONTINUED | OUTPATIENT
Start: 2024-11-12 | End: 2024-11-14

## 2024-11-12 RX ORDER — TESTOSTERONE CYPIONATE 200 MG/ML
200 INJECTION, SOLUTION INTRAMUSCULAR
Status: DISCONTINUED | OUTPATIENT
Start: 2024-11-13 | End: 2024-11-15 | Stop reason: HOSPADM

## 2024-11-12 RX ORDER — THIAMINE HYDROCHLORIDE 100 MG/ML
200 INJECTION, SOLUTION INTRAMUSCULAR; INTRAVENOUS EVERY 8 HOURS SCHEDULED
Status: DISCONTINUED | OUTPATIENT
Start: 2024-11-12 | End: 2024-11-15 | Stop reason: HOSPADM

## 2024-11-12 RX ORDER — OXYCODONE HYDROCHLORIDE 5 MG/1
5 TABLET ORAL EVERY 4 HOURS PRN
Status: DISCONTINUED | OUTPATIENT
Start: 2024-11-12 | End: 2024-11-15 | Stop reason: HOSPADM

## 2024-11-12 RX ORDER — TESTOSTERONE CYPIONATE 200 MG/ML
200 VIAL (ML) INTRAMUSCULAR
COMMUNITY

## 2024-11-12 RX ORDER — DICLOFENAC SODIUM 75 MG/1
75 TABLET, DELAYED RELEASE ORAL 2 TIMES DAILY
COMMUNITY
End: 2024-11-15 | Stop reason: HOSPADM

## 2024-11-12 RX ORDER — DUTASTERIDE 0.5 MG/1
0.5 CAPSULE, LIQUID FILLED ORAL DAILY
COMMUNITY

## 2024-11-12 RX ORDER — TESTOSTERONE 20.25 MG/1.25G
1 GEL TOPICAL DAILY
COMMUNITY

## 2024-11-12 RX ORDER — CHOLESTYRAMINE LIGHT 4 G/5.7G
1 POWDER, FOR SUSPENSION ORAL EVERY 12 HOURS SCHEDULED
Status: DISCONTINUED | OUTPATIENT
Start: 2024-11-12 | End: 2024-11-12

## 2024-11-12 RX ORDER — HYDROMORPHONE HYDROCHLORIDE 1 MG/ML
0.5 INJECTION, SOLUTION INTRAMUSCULAR; INTRAVENOUS; SUBCUTANEOUS ONCE
Status: COMPLETED | OUTPATIENT
Start: 2024-11-12 | End: 2024-11-12

## 2024-11-12 RX ORDER — LORAZEPAM 1 MG/1
1 TABLET ORAL
Status: DISCONTINUED | OUTPATIENT
Start: 2024-11-12 | End: 2024-11-15 | Stop reason: HOSPADM

## 2024-11-12 RX ORDER — BUPROPION HYDROCHLORIDE 150 MG/1
300 TABLET ORAL DAILY
Status: DISCONTINUED | OUTPATIENT
Start: 2024-11-12 | End: 2024-11-15 | Stop reason: HOSPADM

## 2024-11-12 RX ORDER — FAMOTIDINE 20 MG/1
20 TABLET, FILM COATED ORAL NIGHTLY PRN
COMMUNITY

## 2024-11-12 RX ORDER — POLYETHYLENE GLYCOL 3350 17 G/17G
17 POWDER, FOR SOLUTION ORAL DAILY PRN
Status: DISCONTINUED | OUTPATIENT
Start: 2024-11-12 | End: 2024-11-15 | Stop reason: HOSPADM

## 2024-11-12 RX ORDER — NAPROXEN 250 MG/1
500 TABLET ORAL 2 TIMES DAILY WITH MEALS
Status: CANCELLED | OUTPATIENT
Start: 2024-11-12

## 2024-11-12 RX ORDER — GLUCAGON 1 MG/ML
1 KIT INJECTION
Status: DISCONTINUED | OUTPATIENT
Start: 2024-11-12 | End: 2024-11-15 | Stop reason: HOSPADM

## 2024-11-12 RX ORDER — FAMOTIDINE 20 MG/1
20 TABLET, FILM COATED ORAL NIGHTLY PRN
Status: DISCONTINUED | OUTPATIENT
Start: 2024-11-12 | End: 2024-11-15 | Stop reason: HOSPADM

## 2024-11-12 RX ORDER — TESTOSTERONE 20.25 MG/1.25G
1 GEL TOPICAL DAILY
Status: CANCELLED | OUTPATIENT
Start: 2024-11-12

## 2024-11-12 RX ORDER — TESTOSTERONE CYPIONATE 200 MG/ML
200 INJECTION, SOLUTION INTRAMUSCULAR
Status: CANCELLED | OUTPATIENT
Start: 2024-11-12

## 2024-11-12 RX ORDER — NITROGLYCERIN 0.4 MG/1
0.4 TABLET SUBLINGUAL
Status: DISCONTINUED | OUTPATIENT
Start: 2024-11-12 | End: 2024-11-15 | Stop reason: HOSPADM

## 2024-11-12 RX ADMIN — ONDANSETRON 4 MG: 2 INJECTION INTRAMUSCULAR; INTRAVENOUS at 20:53

## 2024-11-12 RX ADMIN — THIAMINE HYDROCHLORIDE 200 MG: 100 INJECTION, SOLUTION INTRAMUSCULAR; INTRAVENOUS at 20:53

## 2024-11-12 RX ADMIN — HYDROMORPHONE HYDROCHLORIDE 0.25 MG: 1 INJECTION, SOLUTION INTRAMUSCULAR; INTRAVENOUS; SUBCUTANEOUS at 22:39

## 2024-11-12 RX ADMIN — TRIAMCINOLONE ACETONIDE 1 APPLICATION: 1 CREAM TOPICAL at 09:29

## 2024-11-12 RX ADMIN — FOLIC ACID 1 MG: 5 INJECTION, SOLUTION INTRAMUSCULAR; INTRAVENOUS; SUBCUTANEOUS at 17:27

## 2024-11-12 RX ADMIN — SODIUM CHLORIDE 100 ML/HR: 9 INJECTION, SOLUTION INTRAVENOUS at 04:10

## 2024-11-12 RX ADMIN — TRIAMCINOLONE ACETONIDE 1 APPLICATION: 1 CREAM TOPICAL at 20:55

## 2024-11-12 RX ADMIN — BREXPIPRAZOLE 1 MG: 1 TABLET ORAL at 09:24

## 2024-11-12 RX ADMIN — BUPROPION HYDROCHLORIDE 300 MG: 150 TABLET, EXTENDED RELEASE ORAL at 09:24

## 2024-11-12 RX ADMIN — Medication 10 ML: at 09:29

## 2024-11-12 RX ADMIN — HYDROMORPHONE HYDROCHLORIDE 1 MG: 1 INJECTION, SOLUTION INTRAMUSCULAR; INTRAVENOUS; SUBCUTANEOUS at 00:32

## 2024-11-12 RX ADMIN — Medication 10 ML: at 20:55

## 2024-11-12 RX ADMIN — ONDANSETRON 4 MG: 2 INJECTION INTRAMUSCULAR; INTRAVENOUS at 09:37

## 2024-11-12 RX ADMIN — HEPARIN SODIUM 5000 UNITS: 5000 INJECTION INTRAVENOUS; SUBCUTANEOUS at 09:30

## 2024-11-12 RX ADMIN — FINASTERIDE 5 MG: 5 TABLET, FILM COATED ORAL at 09:28

## 2024-11-12 RX ADMIN — HYDROMORPHONE HYDROCHLORIDE 0.5 MG: 1 INJECTION, SOLUTION INTRAMUSCULAR; INTRAVENOUS; SUBCUTANEOUS at 09:37

## 2024-11-12 RX ADMIN — HYDROMORPHONE HYDROCHLORIDE 0.5 MG: 1 INJECTION, SOLUTION INTRAMUSCULAR; INTRAVENOUS; SUBCUTANEOUS at 01:49

## 2024-11-12 RX ADMIN — HYDROMORPHONE HYDROCHLORIDE 0.5 MG: 1 INJECTION, SOLUTION INTRAMUSCULAR; INTRAVENOUS; SUBCUTANEOUS at 06:44

## 2024-11-12 RX ADMIN — OXYCODONE 5 MG: 5 TABLET ORAL at 17:32

## 2024-11-12 RX ADMIN — THIAMINE HYDROCHLORIDE 200 MG: 100 INJECTION, SOLUTION INTRAMUSCULAR; INTRAVENOUS at 15:42

## 2024-11-12 NOTE — PROGRESS NOTES
Patient seen and examined during am rounds. H&P from same day reviewed as well as admission labwork and imaging. Agree with plan as previously documented with below updates:     -Abd remains significantly tender but improving along with nausea, cont prn dilaudid and added oxycodone PO if tolerated to reduce IV opioid usage. If sx improving in am can advance to [facility]  -CIWA 4, added prn ativan protocol and michaela thiamine and folate IV given alcohol usage  -Cont holding home antiretrovirals for now, resume once PO intake tolerated. 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

## 2024-11-12 NOTE — ED NOTES
Patient refuses to stay on monitors. Patient has been placed on monitors multiple times by different members of ED staff and will not keep monitors on.

## 2024-11-12 NOTE — PROGRESS NOTES
Adult Nutrition  Assessment/PES    Patient Name:  Jag Bueno  YOB: 1965  MRN: [id number]  Admit Date:  11/11/2024    Assessment Date:  11/12/2024    Comments:  MST    Adv diet as clinically indicated pending symptoms/labs    Will cont to follow and monitor.      Reason for Assessment       Row Name 11/12/24 1706          Reason for Assessment    Reason For Assessment identified at risk by screening criteria  remote [place], [place]     Diagnosis gastrointestinal disease  pancreatitis hx HIV/AIDS, anxiety     Identified At Risk by Screening Criteria MST SCORE 2+                    Nutrition/Diet History       Row Name 11/12/24 1706          Nutrition/Diet History    Typical Intake (Food/Fluid/EN/PN) Pt NPO                    Labs/Tests/Procedures/Meds       Row Name 11/12/24 1706          Labs/Procedures/Meds    Lab Results Reviewed reviewed     Lab Results Comments lip >3000        Diagnostic Tests/Procedures    Diagnostic Test/Procedure Reviewed reviewed        Medications    Pertinent Medications Reviewed reviewed     Pertinent Medications Comments thiamine, folic acid, questran                      Estimated/Assessed Needs - Anthropometrics       Row Name 11/12/24 1707          Anthropometrics    Calculation Weight 88.7 kg (195 lb 8.8 oz)        Estimated/Assessed Needs    Additional Documentation Estimated Calorie Needs (Group);Fluid Requirements (Group);Protein Requirements (Group)        Estimated Calorie Needs    Estimated Calorie Require (kcal/day) 1083-6747 kcal ( mifflin 1.2-1.4)     Estimated Calorie Need Method [place]-St Jeor        Protein Requirements    Est Protein Requirement Amount (gms/kg) 1.0 gm protein   gm pro (1-1.2 gm/kg pro)        Fluid Requirements    Estimated Fluid Requirement Method RDA Method  [id number] ml                    Nutrition Prescription Ordered       Row Name 11/12/24 1708          Nutrition Prescription PO    Current PO Diet NPO                     Evaluation of Received Nutrient/Fluid Intake       Row Name 11/12/24 1708          Fluid Intake Evaluation    Oral Fluid (mL) --  insufficient data        PO Evaluation    Number of Days PO Intake Evaluated Insufficient Data                       Problem/Interventions:   Problem 1       Row Name 11/12/24 1709          Nutrition Diagnoses Problem 1    Problem 1 Other (comment)  Altered GI tract function related to pancreatitis as evidenced by NPO and Lipase                          Intervention Goal       Row Name 11/12/24 1709          Intervention Goal    General Meet nutritional needs for age/condition     PO Advance diet;PO intake (%)     PO Intake % 50 %                    Nutrition Intervention       Row Name 11/12/24 1709          Nutrition Intervention    RD/Tech Action Follow Tx progress                      Education/Evaluation       Row Name 11/12/24 1710          Education    Education Education not appropriate at this time        Monitor/Evaluation    Monitor Per protocol;I&O;PO intake;Pertinent labs;Weight;Symptoms                     Electronically signed by:  Namita Ludwig RD  11/12/24 17:10 EST

## 2024-11-12 NOTE — ED PROVIDER NOTES
Subjective   History of Present Illness  59-year-old male with a past medical history of anxiety, AIDS/HIV, and sleep apnea presents to the ER with a primary complaint of severe and sudden onset epigastric abdominal pain.  Confirmed associated nausea with diaphoresis.  No significant vomiting.  No changes in bowel or urinary habits.  No pain with urination.  No obvious alleviating factors.  Patient is obviously uncomfortable.  Vitals stable.  Afebrile      Review of Systems   Gastrointestinal:  Positive for abdominal pain and nausea.   All other systems reviewed and are negative.      Past Medical History:   Diagnosis Date    AIDS (acquired immune deficiency syndrome)     Anxiety     HIV (human immunodeficiency virus infection)     Sleep apnea     no c-pap       Allergies   Allergen Reactions    Codeine Shortness Of Breath       Past Surgical History:   Procedure Laterality Date    ANTERIOR CERVICAL DISCECTOMY W/ FUSION Left 11/3/2021    Procedure: CERVICAL DISCECTOMY ANTERIOR WITH FUSION C6-7 LEFT;  Surgeon: Roldan Murphy MD;  Location: [facility];  Service: Neurosurgery;  Laterality: Left;    APPENDECTOMY      COLONOSCOPY  2020    EYE SURGERY Bilateral     lasik    GALLBLADDER SURGERY         Family History   Problem Relation Age of Onset    Heart disease Mother     Diabetes Sister     Hypertension Sister        Social History     Socioeconomic History    Marital status: Single   Tobacco Use    Smoking status: Every Day     Current packs/day: 0.50     Average packs/day: 0.5 packs/day for 40.0 years (20.0 ttl pk-yrs)     Types: Cigarettes    Smokeless tobacco: Never   Vaping Use    Vaping status: Never Used   Substance and Sexual Activity    Alcohol use: Yes     Comment: 2 drinks per week    Drug use: Yes     Types: Marijuana     Comment: avg deepak every 3 weeks, last use over 1 month ago    Sexual activity: Defer           Objective   Physical Exam  Constitutional:       General: He is not in acute distress.      Appearance: He is well-developed. He is not ill-appearing.   HENT:      Head: Normocephalic and atraumatic.   Eyes:      Extraocular Movements: Extraocular movements intact.      Pupils: Pupils are equal, round, and reactive to light.   Neck:      Vascular: No JVD.   Cardiovascular:      Rate and Rhythm: Normal rate and regular rhythm.      Heart sounds: Normal heart sounds. No murmur heard.  Pulmonary:      Effort: No tachypnea, accessory muscle usage or respiratory distress.      Breath sounds: Normal breath sounds. No stridor. No decreased breath sounds, wheezing, rhonchi or rales.   Chest:      Chest wall: No deformity, tenderness or crepitus.   Abdominal:      General: Bowel sounds are normal.      Palpations: Abdomen is soft.      Tenderness: There is guarding. There is no rebound.   Musculoskeletal:         General: Normal range of motion.      Cervical back: Normal range of motion and neck supple.      Right lower leg: No tenderness. No edema.      Left lower leg: No tenderness. No edema.   Lymphadenopathy:      Cervical: No cervical adenopathy.   Skin:     General: Skin is warm and dry.      Coloration: Skin is not cyanotic.      Findings: No ecchymosis or erythema.   Neurological:      General: No focal deficit present.      Mental Status: He is alert and oriented to person, place, and time.      Cranial Nerves: No cranial nerve deficit.      Motor: No weakness.   Psychiatric:         Mood and Affect: Mood normal. Mood is not anxious.         Behavior: Behavior normal. Behavior is not agitated.         Procedures           ED Course                    No data recorded                 CT Abdomen Pelvis With Contrast    Result Date: 11/12/2024  1. Haziness of the fat adjacent to the pancreas as above. The findings strongly suggest acute pancreatitis. 2. No bowel obstruction or perforation. 3. No acute appendicitis, colitis or diverticulitis.    This report was finalized on 11/12/2024 12:50 AM by Jose  MD Trav.         Results for orders placed or performed during the hospital encounter of 11/11/24   POC Glucose Once    Collection Time: 11/11/24  8:50 PM    Specimen: Blood   Result Value Ref Range    Glucose 116 70 - 130 mg/dL   Comprehensive Metabolic Panel    Collection Time: 11/11/24  9:14 PM    Specimen: Blood   Result Value Ref Range    Glucose 120 (H) 65 - 99 mg/dL    BUN 15 6 - 20 mg/dL    Creatinine 1.13 0.76 - 1.27 mg/dL    Sodium 142 136 - 145 mmol/L    Potassium 4.0 3.5 - 5.2 mmol/L    Chloride 104 98 - 107 mmol/L    CO2 25.5 22.0 - 29.0 mmol/L    Calcium 10.7 (H) 8.6 - 10.5 mg/dL    Total Protein 7.4 6.0 - 8.5 g/dL    Albumin 4.8 3.5 - 5.2 g/dL    ALT (SGPT) 23 1 - 41 U/L    AST (SGOT) 21 1 - 40 U/L    Alkaline Phosphatase 64 39 - 117 U/L    Total Bilirubin 0.4 0.0 - 1.2 mg/dL    Globulin 2.6 gm/dL    A/G Ratio 1.8 g/dL    BUN/Creatinine Ratio 13.3 7.0 - 25.0    Anion Gap 12.5 5.0 - 15.0 mmol/L    eGFR 74.9 >60.0 mL/min/1.73   Lipase    Collection Time: 11/11/24  9:14 PM    Specimen: Blood   Result Value Ref Range    Lipase >3,000 (H) 13 - 60 U/L   Lactic Acid, Plasma    Collection Time: 11/11/24  9:14 PM    Specimen: Blood   Result Value Ref Range    Lactate 1.8 0.5 - 2.0 mmol/L   CBC Auto Differential    Collection Time: 11/11/24  9:14 PM    Specimen: Blood   Result Value Ref Range    WBC 14.55 (H) 3.40 - 10.80 10*3/mm3    RBC 5.50 4.14 - 5.80 10*6/mm3    Hemoglobin 17.5 13.0 - 17.7 g/dL    Hematocrit 54.9 (H) 37.5 - 51.0 %    MCV 99.8 (H) 79.0 - 97.0 fL    MCH 31.8 26.6 - 33.0 pg    MCHC 31.9 31.5 - 35.7 g/dL    RDW 15.9 (H) 12.3 - 15.4 %    RDW-SD 58.6 (H) 37.0 - 54.0 fl    MPV 10.4 6.0 - 12.0 fL    Platelets 232 140 - 450 10*3/mm3    Neutrophil % 71.0 42.7 - 76.0 %    Lymphocyte % 21.8 19.6 - 45.3 %    Monocyte % 6.0 5.0 - 12.0 %    Eosinophil % 0.2 (L) 0.3 - 6.2 %    Basophil % 0.2 0.0 - 1.5 %    Immature Grans % 0.8 (H) 0.0 - 0.5 %    Neutrophils, Absolute 10.33 (H) 1.70 - 7.00 10*3/mm3     Lymphocytes, Absolute 3.17 (H) 0.70 - 3.10 10*3/mm3    Monocytes, Absolute 0.88 0.10 - 0.90 10*3/mm3    Eosinophils, Absolute 0.03 0.00 - 0.40 10*3/mm3    Basophils, Absolute 0.03 0.00 - 0.20 10*3/mm3    Immature Grans, Absolute 0.11 (H) 0.00 - 0.05 10*3/mm3    nRBC 0.0 0.0 - 0.2 /100 WBC   Green Top (Gel)    Collection Time: 11/11/24  9:14 PM   Result Value Ref Range    Extra Tube Hold for add-ons.    Lavender Top    Collection Time: 11/11/24  9:14 PM   Result Value Ref Range    Extra Tube hold for add-on    Gold Top - SST    Collection Time: 11/11/24  9:14 PM   Result Value Ref Range    Extra Tube Hold for add-ons.    Light Blue Top    Collection Time: 11/11/24  9:14 PM   Result Value Ref Range    Extra Tube Hold for add-ons.    Urinalysis With Microscopic If Indicated (No Culture) - Urine, Clean Catch    Collection Time: 11/12/24 12:34 AM    Specimen: Urine, Clean Catch   Result Value Ref Range    Color, UA Yellow Yellow, Straw    Appearance, UA Clear Clear    pH, UA <=5.0 5.0 - 8.0    Specific Gravity, UA >1.030 (H) 1.005 - 1.030    Glucose, UA Negative Negative    Ketones, UA Negative Negative    Bilirubin, UA Negative Negative    Blood, UA Negative Negative    Protein, UA 30 mg/dL (1+) (A) Negative    Leuk Esterase, UA Negative Negative    Nitrite, UA Negative Negative    Urobilinogen, UA 0.2 E.U./dL 0.2 - 1.0 E.U./dL   Urinalysis, Microscopic Only - Urine, Clean Catch    Collection Time: 11/12/24 12:34 AM    Specimen: Urine, Clean Catch   Result Value Ref Range    RBC, UA None Seen None Seen, 0-2 /HPF    WBC, UA None Seen None Seen, 0-2 /HPF    Bacteria, UA None Seen None Seen /HPF    Squamous Epithelial Cells, UA 0-2 None Seen, 0-2 /HPF    Hyaline Casts, UA None Seen None Seen /LPF    Methodology Manual Light Microscopy                  Medical Decision Making  CBC and CMP are listed.  Elevated lipase.  Concerns for acute pancreatitis.  CT imaging of the abdomen and pelvis confirmed acute pancreatitis.  IV  pain medication given.  Recommend admission for further work up and treatment.  Hospitalist team consulted and made aware of the patient.  Consults and orders placed per hospitalist request.  Patient was agreeable to admission plan.  Vitals stable on admission.    Problems Addressed:  Acute pancreatitis, unspecified complication status, unspecified pancreatitis type: complicated acute illness or injury    Amount and/or Complexity of Data Reviewed  Labs: ordered. Decision-making details documented in ED Course.  Radiology: ordered. Decision-making details documented in ED Course.    Risk  OTC drugs.  Prescription drug management.  Decision regarding hospitalization.        Final diagnoses:   Acute pancreatitis, unspecified complication status, unspecified pancreatitis type       ED Disposition  ED Disposition       ED Disposition   Decision to Admit    Condition   --    Comment   Level of Care: Medical Telemetry [23]   Diagnosis: Acute pancreatitis [577.0.ICD-9-CM]   Admitting Physician: DAVID BOND [[id number]   Certification: I Certify That Inpatient Hospital Services Are Medically Necessary For Greater Than 2 Midnights                 No follow-up provider specified.       Medication List        ASK your doctor about these medications      Testosterone Cypionate 200 MG/ML solution  Ask about: Which instructions should I use?                 Sreedhar Egan, DO  11/12/24 0112

## 2024-11-12 NOTE — H&P
"     [facility]  HISTORY & PHYSICAL    Patient Identification:  Name:  Jag Bueno  Age:  59 y.o.  Sex:  male  :  1965  MRN:  [id number]   Visit Number:  [id number]  Admit Date: 2024   Primary Care Physician:  Lorin Deras MD     Subjective     Chief complaint:   Chief Complaint   Patient presents with    Abdominal Pain     History of presenting illness:   Patient is a 59 y.o. male with past medical history significant for HIV/AIDS, anxiety, sleep apnea  that presented to the [facility] emergency department for evaluation of abdominal pain.     Patient examined at bedside in ER.  He reports that around noon yesterday shortly after eating lunch he began having severe epigastric pain that worsened throughout the day.  He also reports nausea and vomiting.  He states that at first he thought it was \"phantom gallbladder attacks\" that he has had a couple times since having his gallbladder removed.  He states the pain increases if he moves or leans forward.  He does report improvement with pain medications given in ED.  Pain also improves with lying back.  Denies any past history of pancreatitis.  Denies any recent medication changes.  He states he will drink alcohol occasionally, and admits he drank 2 white claws on Saturday.  He states prior to that he had not drank any alcohol in a few weeks.    Upon arrival to the ED, vitals were temperature 98.5, HR 66, RR 18, /78.  Labs significant for lipase greater than 3000, WBC 14.55.  Ethanol level negative.  CT Abdo/pelvis significant for acute pancreatitis.  No bowel obstruction, perforation.  No acute appendicitis colitis or diverticulitis.    Patient has been admitted to the Medical telemetry unit.  ---------------------------------------------------------------------------------------------------------------------   Review of Systems   Constitutional:  Negative for chills and fever. "   Respiratory:  Negative for cough and shortness of breath.    Cardiovascular:  Negative for chest pain and palpitations.   Gastrointestinal:  Positive for abdominal pain, nausea and vomiting.   Genitourinary:  Negative for difficulty urinating and dysuria.   Musculoskeletal:  Negative for arthralgias, myalgias and neck stiffness.   Skin:  Negative for rash and wound.   Neurological:  Positive for light-headedness. Negative for dizziness.   Psychiatric/Behavioral:  Negative for agitation and confusion.       ---------------------------------------------------------------------------------------------------------------------   Past Medical History:   Diagnosis Date    AIDS (acquired immune deficiency syndrome)     Anxiety     HIV (human immunodeficiency virus infection)     Sleep apnea     no c-pap     Past Surgical History:   Procedure Laterality Date    ANTERIOR CERVICAL DISCECTOMY W/ FUSION Left 11/3/2021    Procedure: CERVICAL DISCECTOMY ANTERIOR WITH FUSION C6-7 LEFT;  Surgeon: Roldan Murphy MD;  [facility];  Service: Neurosurgery;  Laterality: Left;    APPENDECTOMY      COLONOSCOPY  2020    EYE SURGERY Bilateral     lasik    GALLBLADDER SURGERY       Family History   Problem Relation Age of Onset    Heart disease Mother     Diabetes Sister     Hypertension Sister      Social History     Socioeconomic History    Marital status: Single   Tobacco Use    Smoking status: Every Day     Current packs/day: 0.50     Average packs/day: 0.5 packs/day for 40.0 years (20.0 ttl pk-yrs)     Types: Cigarettes    Smokeless tobacco: Never   Vaping Use    Vaping status: Never Used   Substance and Sexual Activity    Alcohol use: Yes     Comment: 2 drinks per week    Drug use: Yes     Types: Marijuana     Comment: avg deepak every 3 weeks, last use over 1 month ago    Sexual activity: Defer     ---------------------------------------------------------------------------------------------------------------------    Allergies:  Codeine  ---------------------------------------------------------------------------------------------------------------------   Medications below are reported home medications pulling from within the system; at this time, these medications have not been reconciled unless otherwise specified and are in the verification process for further verifcation as current home medications.    Prior to Admission Medications       Prescriptions Last Dose Informant Patient Reported? Taking?    AndroGel Pump 20.25 MG/ACT (1.62%) gel   Yes No    Out of med for last 3 weeks    Brexpiprazole (Rexulti) 1 MG tablet   Yes No    Take 1 tablet by mouth Daily.    buPROPion XL (WELLBUTRIN XL) 300 MG 24 hr tablet   Yes No    Take 1 tablet by mouth Daily.    colesevelam (WELCHOL) 3.75 g pack pack   Yes No    Take 1 packet by mouth Daily.    Darunavir-Cobicistat (PREZCOBIX) 800-150 MG per tablet   Yes No    Take 1 tablet by mouth Daily.    diclofenac (VOLTAREN) 75 MG EC tablet   Yes No    Take 1 tablet by mouth 2 (Two) Times a Day.    dolutegravir (TIVICAY) 50 MG tablet   Yes No    Take 1 tablet by mouth Daily.    Doravirine (Pifeltro) 100 MG tablet   Yes No    Take 100 mg by mouth Daily.    dutasteride (AVODART) 0.5 MG capsule   Yes No    Take 1 capsule by mouth Daily.    famotidine (PEPCID) 20 MG tablet   Yes No    Take 1 tablet by mouth At Night As Needed for Heartburn.    Fostemsavir Tromethamine  MG tablet sustained-release 12 hour   Yes No    Take 600 mg by mouth 2 (Two) Times a Day.    LORazepam (ATIVAN) 1 MG tablet  Medication Bottle Yes No    Take 1 tablet by mouth Every 8 (Eight) Hours As Needed for Anxiety.    Testosterone Cypionate 200 MG/ML solution   Yes No    Inject  as directed.    triamcinolone (KENALOG) 0.1 % cream   Yes No    Apply 1 Application topically to the appropriate area as directed 2 (Two) Times a Day.           ---------------------------------------------------------------------------------------------------------------------    Objective     Hospital Scheduled Meds:          Current listed hospital scheduled medications may not yet reflect those currently placed in orders that are signed and held, awaiting patient's arrival to floor/unit.    ---------------------------------------------------------------------------------------------------------------------   Vital Signs:  Temp:  [98.5 °F (36.9 °C)] 98.5 °F (36.9 °C)  Heart Rate:  [62-69] 67  Resp:  [18] 18  BP: (130-150)/(78-94) 130/93  Mean Arterial Pressure (Non-Invasive) for the past 24 hrs (Last 3 readings):   Noninvasive MAP (mmHg)   11/11/24 2343 112   11/11/24 2229 112   11/11/24 2214 118     SpO2 Percentage    11/11/24 2214 11/11/24 2229 11/11/24 2343   SpO2: 97% 97% 95%     SpO2:  [95 %-98 %] 95 %  on   ;   Device (Oxygen Therapy): room air    Body mass index is 26.78 kg/m².  Wt Readings from Last 3 Encounters:   11/11/24 92.1 kg (203 lb)   12/11/23 93.1 kg (205 lb 3.2 oz)   12/06/21 92 kg (202 lb 12.8 oz)     ---------------------------------------------------------------------------------------------------------------------   Physical Exam:  Physical Exam  Constitutional:       General: He is not in acute distress.     Appearance: Normal appearance.   HENT:      Head: Normocephalic and atraumatic.      Right Ear: External ear normal.      Left Ear: External ear normal.      Nose: No nasal deformity.      Mouth/Throat:      Lips: Pink.      Mouth: Mucous membranes are moist.   Eyes:      Conjunctiva/sclera: Conjunctivae normal.      Pupils: Pupils are equal, round, and reactive to light.   Cardiovascular:      Rate and Rhythm: Normal rate and regular rhythm.      Pulses:           Dorsalis pedis pulses are 2+ on the right side and 2+ on the left side.      Heart sounds: Normal heart sounds.   Pulmonary:      Effort: Pulmonary effort is normal.      Breath  "sounds: Normal breath sounds. No wheezing, rhonchi or rales.   Abdominal:      General: Abdomen is flat. Bowel sounds are normal.      Palpations: Abdomen is soft.      Tenderness: There is abdominal tenderness in the epigastric area. There is guarding.   Genitourinary:     Comments: No charlton catheter in place   Musculoskeletal:      Cervical back: Neck supple. Normal range of motion.      Right lower leg: No edema.      Left lower leg: No edema.   Skin:     General: Skin is warm and dry.   Neurological:      General: No focal deficit present.      Mental Status: He is alert and oriented to person, place, and time.   Psychiatric:         Mood and Affect: Mood normal.         Behavior: Behavior normal.       ---------------------------------------------------------------------------------------------------------------------  EKG:    Pending    I have personally reviewed the EKG/Telemetry strip  ---------------------------------------------------------------------------------------------------------------------             Results from last 7 days   Lab Units 11/11/24  2114   LACTATE mmol/L 1.8   WBC 10*3/mm3 14.55*   HEMOGLOBIN g/dL 17.5   HEMATOCRIT % 54.9*   MCV fL 99.8*   MCHC g/dL 31.9   PLATELETS 10*3/mm3 232     Results from last 7 days   Lab Units 11/11/24  2114   SODIUM mmol/L 142   POTASSIUM mmol/L 4.0   CHLORIDE mmol/L 104   CO2 mmol/L 25.5   BUN mg/dL 15   CREATININE mg/dL 1.13   CALCIUM mg/dL 10.7*   GLUCOSE mg/dL 120*   ALBUMIN g/dL 4.8   BILIRUBIN mg/dL 0.4   ALK PHOS U/L 64   AST (SGOT) U/L 21   ALT (SGPT) U/L 23   Estimated Creatinine Clearance: 91.7 mL/min (by C-G formula based on SCr of 1.13 mg/dL).  No results found for: \"AMMONIA\"    Glucose   Date/Time Value Ref Range Status   11/11/2024 2050 116 70 - 130 mg/dL Final     Lab Results   Component Value Date    HGBA1C 5.0 05/08/2023     Lab Results   Component Value Date    FREET4 1.3 05/24/2022       No results found for: \"BLOODCX\"  No results found " "for: \"URINECX\"  No results found for: \"WOUNDCX\"  No results found for: \"STOOLCX\"  No results found for: \"RESPCX\"  No results found for: \"MRSACX\"  Pain Management Panel           No data to display              I have personally reviewed the above laboratory results.   ---------------------------------------------------------------------------------------------------------------------  Imaging Results (Last 7 Days)       Procedure Component Value Units Date/Time    CT Abdomen Pelvis With Contrast [[id number]] Collected: 11/12/24 0043     Updated: 11/12/24 0052    Narrative:      EXAMINATION: CT abdomen and pelvis with contrast     CLINICAL HISTORY: Abdominal pain     COMPARISON: None.     TECHNIQUE: Contiguous 5 mm thick slices were obtained through the  abdomen and pelvis after the administration of Isovue-300 intravenous  contrast. Coronal and sagittal reformats were performed.     FINDINGS:     ABDOMEN:  Mild scarring or atelectasis is noted posteriorly within the lower  lobes. The gallbladder is surgically absent. There is infiltration of  the fat adjacent to the pancreas. The findings strongly suggest acute  pancreatitis. A focus of calcification within the head of the pancreas  is likely the sequela of prior inflammation. There is mild wall  thickening within the duodenum, likely reactive in nature. No adjacent  focal fluid collection is identified to suggest a pseudocyst or abscess.  The adrenal glands and spleen appear unremarkable. No hydronephrosis  either kidney. No definite ureteral calculus.     PELVIS:  There is no bowel obstruction or perforation. No free air. No  significant free or loculated collection is appreciated. No adenopathy,  free or loculated collection is appreciated within the pelvis.       Impression:      1. Haziness of the fat adjacent to the pancreas as above. The findings  strongly suggest acute pancreatitis.  2. No bowel obstruction or perforation.  3. No acute appendicitis, colitis " or diverticulitis.           This report was finalized on 11/12/2024 12:50 AM by Jose Ravi MD.             I have personally reviewed the above radiology results.     Last Echocardiogram:    ---------------------------------------------------------------------------------------------------------------------    Assessment & Plan      Active Hospital Problems    Diagnosis  POA    **Acute pancreatitis [K85.90]  Yes     Acute pancreatitis, POA   Lipase >3,000. CT abd/pelvis consistent with acute pancreatitis  Currently suspected etiology is hypertriglyceridemia as patient was on medications that list that is a potential side effect.  Triglyceride level ordered for the a.m.  Could also be secondary to alcohol use, however this is lower suspicion since patient has not drank any alcohol in 3 days, and does not drink heavily.  As needed pain and nausea management.  Keep patient n.p.o.  Can advance as pain improves if patient tolerates.  UDS ordered  HIV/AIDS  Anxiety  Restart home medications pending med rec and triglyceride levels  Sleep apnea  Does not utilize CPAP    F/E/N: NS at 100 mL/h.  Continue to monitor electrolytes.  NPO.    ---------------------------------------------------  DVT Prophylaxis: Subcu heparin  Activity: Up with assistance    The patient is considered to be a high risk patient due to: Acute pancreatitis    INPATIENT status due to the need for care which can only be reasonably provided in an hospital setting such as aggressive/expedited ancillary services and/or consultation services, the necessity for IV medications, close physician monitoring and/or the possible need for procedures.  In such, I feel patient’s risk for adverse outcomes and need for care warrant INPATIENT evaluation and predict the patient’s care encounter to likely last beyond 2 midnights.'    Code Status: Full code    Disposition/Discharge planning: Pending clinical course    I have discussed the patient's assessment and plan  with Dr. Angie Issa PA-C  Hospitalist Service -- [facility]       11/12/24  01:39 EST    Attending Physician: Dr. Adams.

## 2024-11-12 NOTE — PAYOR COMM NOTE
"CONTACT:  ALYSHA VENTURA RN  UTILIZATION MANAGEMENT DEPT.   [facility]   [address] [facility], [address]   PHONE:  [phone]   FAX: [phone]         INPATIENT AUTH REQUEST          Griselda Bueno (59 y.o. Male)       Date of Birth   1965    Social Security Number       Address   [address] [address]    Home Phone   [phone]    MRN   [id number]       Orthodox   Sikh    Marital Status   Single                            Admission Date   11/11/24    Admission Type   Emergency    Admitting Provider   Dorothy Adams DO    Attending Provider   Deejay Vaughan MD    Department, Room/Bed   [address], [facility]5/       Discharge Date       Discharge Disposition       Discharge Destination                                 Attending Provider: Deejay Vaughan MD    Allergies: Codeine    Isolation: None   Infection: None   Code Status: CPR    Ht: 185.4 cm (73\")   Wt: 88.7 kg (195 lb 8 oz)    Admission Cmt: None   Principal Problem: Acute pancreatitis [K85.90]                   Active Insurance as of 11/11/2024       Primary Coverage       Payor Plan Insurance Group Employer/Plan Group    ANTHEM MEDICAID ANTHEM MEDICAID [id number]       Payor Plan Address Payor Plan Phone Number Payor Plan Fax Number Effective Dates    [address] [address] [phone]  6/1/2016 - None Entered    [facility] [id number]         Subscriber Name Subscriber Birth Date Member ID       GRISELDA BUENO 1965 [id number]                     Emergency Contacts        (Rel.) Home Phone Work Phone Mobile Phone    AshleyLakshmi (Sister) [phone] -- [phone]                 History & Physical        Valentina Issa PA-C at 11/12/24 0138       Attestation signed by Dorothy Adams DO at 11/12/24 0640    I have reviewed this documentation and agree. Triglyceride level 338 so not etiology; patient's LFTs WNL. Suspect likely alcohol induced with " "last drink being 2 days prior to admission. NPO, IV fluids, prn anti-emetics and analgesics.                       [facility] Services  HISTORY & PHYSICAL    Patient Identification:  Name:  Jag Bueno  Age:  59 y.o.  Sex:  male  :  1965  MRN:  [id number]   Visit Number:  [id number]  Admit Date: 2024   Primary Care Physician:  Lorin Deras MD     Subjective     Chief complaint:   Chief Complaint   Patient presents with    Abdominal Pain     History of presenting illness:   Patient is a 59 y.o. male with past medical history significant for HIV/AIDS, anxiety, sleep apnea  that presented to the [facility] emergency department for evaluation of abdominal pain.     Patient examined at bedside in ER.  He reports that around noon yesterday shortly after eating lunch he began having severe epigastric pain that worsened throughout the day.  He also reports nausea and vomiting.  He states that at first he thought it was \"phantom gallbladder attacks\" that he has had a couple times since having his gallbladder removed.  He states the pain increases if he moves or leans forward.  He does report improvement with pain medications given in ED.  Pain also improves with lying back.  Denies any past history of pancreatitis.  Denies any recent medication changes.  He states he will drink alcohol occasionally, and admits he drank 2 white claws on Saturday.  He states prior to that he had not drank any alcohol in a few weeks.    Upon arrival to the ED, vitals were temperature 98.5, HR 66, RR 18, /78.  Labs significant for lipase greater than 3000, WBC 14.55.  Ethanol level negative.  CT Abdo/pelvis significant for acute pancreatitis.  No bowel obstruction, perforation.  No acute appendicitis colitis or diverticulitis.    Patient has been admitted to the Medical telemetry " unit.  ---------------------------------------------------------------------------------------------------------------------   Review of Systems   Constitutional:  Negative for chills and fever.   Respiratory:  Negative for cough and shortness of breath.    Cardiovascular:  Negative for chest pain and palpitations.   Gastrointestinal:  Positive for abdominal pain, nausea and vomiting.   Genitourinary:  Negative for difficulty urinating and dysuria.   Musculoskeletal:  Negative for arthralgias, myalgias and neck stiffness.   Skin:  Negative for rash and wound.   Neurological:  Positive for light-headedness. Negative for dizziness.   Psychiatric/Behavioral:  Negative for agitation and confusion.       ---------------------------------------------------------------------------------------------------------------------   Past Medical History:   Diagnosis Date    AIDS (acquired immune deficiency syndrome)     Anxiety     HIV (human immunodeficiency virus infection)     Sleep apnea     no c-pap     Past Surgical History:   Procedure Laterality Date    ANTERIOR CERVICAL DISCECTOMY W/ FUSION Left 11/3/2021    Procedure: CERVICAL DISCECTOMY ANTERIOR WITH FUSION C6-7 LEFT;  Surgeon: Roldan Murphy MD;  Location: [facility];  Service: Neurosurgery;  Laterality: Left;    APPENDECTOMY      COLONOSCOPY  2020    EYE SURGERY Bilateral     lasik    GALLBLADDER SURGERY       Family History   Problem Relation Age of Onset    Heart disease Mother     Diabetes Sister     Hypertension Sister      Social History     Socioeconomic History    Marital status: Single   Tobacco Use    Smoking status: Every Day     Current packs/day: 0.50     Average packs/day: 0.5 packs/day for 40.0 years (20.0 ttl pk-yrs)     Types: Cigarettes    Smokeless tobacco: Never   Vaping Use    Vaping status: Never Used   Substance and Sexual Activity    Alcohol use: Yes     Comment: 2 drinks per week    Drug use: Yes     Types: Marijuana     Comment: rangel sotelo  every 3 weeks, last use over 1 month ago    Sexual activity: Defer     ---------------------------------------------------------------------------------------------------------------------   Allergies:  Codeine  ---------------------------------------------------------------------------------------------------------------------   Medications below are reported home medications pulling from within the system; at this time, these medications have not been reconciled unless otherwise specified and are in the verification process for further verifcation as current home medications.    Prior to Admission Medications       Prescriptions Last Dose Informant Patient Reported? Taking?    AndroGel Pump 20.25 MG/ACT (1.62%) gel   Yes No    Out of med for last 3 weeks    Brexpiprazole (Rexulti) 1 MG tablet   Yes No    Take 1 tablet by mouth Daily.    buPROPion XL (WELLBUTRIN XL) 300 MG 24 hr tablet   Yes No    Take 1 tablet by mouth Daily.    colesevelam (WELCHOL) 3.75 g pack pack   Yes No    Take 1 packet by mouth Daily.    Darunavir-Cobicistat (PREZCOBIX) 800-150 MG per tablet   Yes No    Take 1 tablet by mouth Daily.    diclofenac (VOLTAREN) 75 MG EC tablet   Yes No    Take 1 tablet by mouth 2 (Two) Times a Day.    dolutegravir (TIVICAY) 50 MG tablet   Yes No    Take 1 tablet by mouth Daily.    Doravirine (Pifeltro) 100 MG tablet   Yes No    Take 100 mg by mouth Daily.    dutasteride (AVODART) 0.5 MG capsule   Yes No    Take 1 capsule by mouth Daily.    famotidine (PEPCID) 20 MG tablet   Yes No    Take 1 tablet by mouth At Night As Needed for Heartburn.    Fostemsavir Tromethamine  MG tablet sustained-release 12 hour   Yes No    Take 600 mg by mouth 2 (Two) Times a Day.    LORazepam (ATIVAN) 1 MG tablet  Medication Bottle Yes No    Take 1 tablet by mouth Every 8 (Eight) Hours As Needed for Anxiety.    Testosterone Cypionate 200 MG/ML solution   Yes No    Inject  as directed.    triamcinolone (KENALOG) 0.1 % cream   Yes  No    Apply 1 Application topically to the appropriate area as directed 2 (Two) Times a Day.          ---------------------------------------------------------------------------------------------------------------------    Objective     Hospital Scheduled Meds:          Current listed hospital scheduled medications may not yet reflect those currently placed in orders that are signed and held, awaiting patient's arrival to floor/unit.    ---------------------------------------------------------------------------------------------------------------------   Vital Signs:  Temp:  [98.5 °F (36.9 °C)] 98.5 °F (36.9 °C)  Heart Rate:  [62-69] 67  Resp:  [18] 18  BP: (130-150)/(78-94) 130/93  Mean Arterial Pressure (Non-Invasive) for the past 24 hrs (Last 3 readings):   Noninvasive MAP (mmHg)   11/11/24 2343 112   11/11/24 2229 112   11/11/24 2214 118     SpO2 Percentage    11/11/24 2214 11/11/24 2229 11/11/24 2343   SpO2: 97% 97% 95%     SpO2:  [95 %-98 %] 95 %  on   ;   Device (Oxygen Therapy): room air    Body mass index is 26.78 kg/m².  Wt Readings from Last 3 Encounters:   11/11/24 92.1 kg (203 lb)   12/11/23 93.1 kg (205 lb 3.2 oz)   12/06/21 92 kg (202 lb 12.8 oz)     ---------------------------------------------------------------------------------------------------------------------   Physical Exam:  Physical Exam  Constitutional:       General: He is not in acute distress.     Appearance: Normal appearance.   HENT:      Head: Normocephalic and atraumatic.      Right Ear: External ear normal.      Left Ear: External ear normal.      Nose: No nasal deformity.      Mouth/Throat:      Lips: Pink.      Mouth: Mucous membranes are moist.   Eyes:      Conjunctiva/sclera: Conjunctivae normal.      Pupils: Pupils are equal, round, and reactive to light.   Cardiovascular:      Rate and Rhythm: Normal rate and regular rhythm.      Pulses:           Dorsalis pedis pulses are 2+ on the right side and 2+ on the left side.       "Heart sounds: Normal heart sounds.   Pulmonary:      Effort: Pulmonary effort is normal.      Breath sounds: Normal breath sounds. No wheezing, rhonchi or rales.   Abdominal:      General: Abdomen is flat. Bowel sounds are normal.      Palpations: Abdomen is soft.      Tenderness: There is abdominal tenderness in the epigastric area. There is guarding.   Genitourinary:     Comments: No charlton catheter in place   Musculoskeletal:      Cervical back: Neck supple. Normal range of motion.      Right lower leg: No edema.      Left lower leg: No edema.   Skin:     General: Skin is warm and dry.   Neurological:      General: No focal deficit present.      Mental Status: He is alert and oriented to person, place, and time.   Psychiatric:         Mood and Affect: Mood normal.         Behavior: Behavior normal.       ---------------------------------------------------------------------------------------------------------------------  EKG:    Pending    I have personally reviewed the EKG/Telemetry strip  ---------------------------------------------------------------------------------------------------------------------             Results from last 7 days   Lab Units 11/11/24  2114   LACTATE mmol/L 1.8   WBC 10*3/mm3 14.55*   HEMOGLOBIN g/dL 17.5   HEMATOCRIT % 54.9*   MCV fL 99.8*   MCHC g/dL 31.9   PLATELETS 10*3/mm3 232     Results from last 7 days   Lab Units 11/11/24  2114   SODIUM mmol/L 142   POTASSIUM mmol/L 4.0   CHLORIDE mmol/L 104   CO2 mmol/L 25.5   BUN mg/dL 15   CREATININE mg/dL 1.13   CALCIUM mg/dL 10.7*   GLUCOSE mg/dL 120*   ALBUMIN g/dL 4.8   BILIRUBIN mg/dL 0.4   ALK PHOS U/L 64   AST (SGOT) U/L 21   ALT (SGPT) U/L 23   Estimated Creatinine Clearance: 91.7 mL/min (by C-G formula based on SCr of 1.13 mg/dL).  No results found for: \"AMMONIA\"    Glucose   Date/Time Value Ref Range Status   11/11/2024 2050 116 70 - 130 mg/dL Final     Lab Results   Component Value Date    HGBA1C 5.0 05/08/2023     Lab Results " "  Component Value Date    FREET4 1.3 05/24/2022       No results found for: \"BLOODCX\"  No results found for: \"URINECX\"  No results found for: \"WOUNDCX\"  No results found for: \"STOOLCX\"  No results found for: \"RESPCX\"  No results found for: \"MRSACX\"  Pain Management Panel           No data to display              I have personally reviewed the above laboratory results.   ---------------------------------------------------------------------------------------------------------------------  Imaging Results (Last 7 Days)       Procedure Component Value Units Date/Time    CT Abdomen Pelvis With Contrast [[id number]] Collected: 11/12/24 0043     Updated: 11/12/24 0052    Narrative:      EXAMINATION: CT abdomen and pelvis with contrast     CLINICAL HISTORY: Abdominal pain     COMPARISON: None.     TECHNIQUE: Contiguous 5 mm thick slices were obtained through the  abdomen and pelvis after the administration of Isovue-300 intravenous  contrast. Coronal and sagittal reformats were performed.     FINDINGS:     ABDOMEN:  Mild scarring or atelectasis is noted posteriorly within the lower  lobes. The gallbladder is surgically absent. There is infiltration of  the fat adjacent to the pancreas. The findings strongly suggest acute  pancreatitis. A focus of calcification within the head of the pancreas  is likely the sequela of prior inflammation. There is mild wall  thickening within the duodenum, likely reactive in nature. No adjacent  focal fluid collection is identified to suggest a pseudocyst or abscess.  The adrenal glands and spleen appear unremarkable. No hydronephrosis  either kidney. No definite ureteral calculus.     PELVIS:  There is no bowel obstruction or perforation. No free air. No  significant free or loculated collection is appreciated. No adenopathy,  free or loculated collection is appreciated within the pelvis.       Impression:      1. Haziness of the fat adjacent to the pancreas as above. The findings  strongly " suggest acute pancreatitis.  2. No bowel obstruction or perforation.  3. No acute appendicitis, colitis or diverticulitis.           This report was finalized on 11/12/2024 12:50 AM by Jose Ravi MD.             I have personally reviewed the above radiology results.     Last Echocardiogram:    ---------------------------------------------------------------------------------------------------------------------    Assessment & Plan      Active Hospital Problems    Diagnosis  POA    **Acute pancreatitis [K85.90]  Yes     Acute pancreatitis, POA   Lipase >3,000. CT abd/pelvis consistent with acute pancreatitis  Currently suspected etiology is hypertriglyceridemia as patient was on medications that list that is a potential side effect.  Triglyceride level ordered for the a.m.  Could also be secondary to alcohol use, however this is lower suspicion since patient has not drank any alcohol in 3 days, and does not drink heavily.  As needed pain and nausea management.  Keep patient n.p.o.  Can advance as pain improves if patient tolerates.  UDS ordered  HIV/AIDS  Anxiety  Restart home medications pending med rec and triglyceride levels  Sleep apnea  Does not utilize CPAP    F/E/N: NS at 100 mL/h.  Continue to monitor electrolytes.  NPO.    ---------------------------------------------------  DVT Prophylaxis: Subcu heparin  Activity: Up with assistance    The patient is considered to be a high risk patient due to: Acute pancreatitis    INPATIENT status due to the need for care which can only be reasonably provided in an hospital setting such as aggressive/expedited ancillary services and/or consultation services, the necessity for IV medications, close physician monitoring and/or the possible need for procedures.  In such, I feel patient’s risk for adverse outcomes and need for care warrant INPATIENT evaluation and predict the patient’s care encounter to likely last beyond 2 midnights.'    Code Status: Full  code    Disposition/Discharge planning: Pending clinical course    I have discussed the patient's assessment and plan with Dr. Angie Issa PA-C  Hospitalist Service -- [facility]       11/12/24  01:39 EST    Attending Physician: Dr. Adams.      Electronically signed by Dorothy Adams DO at 11/12/24 0640          Emergency Department Notes        Tara Fitzpatrick, RN at 11/12/24 0139          Patient refuses to stay on monitors. Patient has been placed on monitors multiple times by different members of ED staff and will not keep monitors on.     Electronically signed by Tara Fitzpatrick RN at 11/12/24 0140       Sreedhar Egan DO at 11/12/24 0109          Subjective   History of Present Illness  59-year-old male with a past medical history of anxiety, AIDS/HIV, and sleep apnea presents to the ER with a primary complaint of severe and sudden onset epigastric abdominal pain.  Confirmed associated nausea with diaphoresis.  No significant vomiting.  No changes in bowel or urinary habits.  No pain with urination.  No obvious alleviating factors.  Patient is obviously uncomfortable.  Vitals stable.  Afebrile      Review of Systems   Gastrointestinal:  Positive for abdominal pain and nausea.   All other systems reviewed and are negative.      Past Medical History:   Diagnosis Date    AIDS (acquired immune deficiency syndrome)     Anxiety     HIV (human immunodeficiency virus infection)     Sleep apnea     no c-pap       Allergies   Allergen Reactions    Codeine Shortness Of Breath       Past Surgical History:   Procedure Laterality Date    ANTERIOR CERVICAL DISCECTOMY W/ FUSION Left 11/3/2021    Procedure: CERVICAL DISCECTOMY ANTERIOR WITH FUSION C6-7 LEFT;  Surgeon: Roldan Murphy MD;  Location: [facility];  Service: Neurosurgery;  Laterality: Left;    APPENDECTOMY      COLONOSCOPY  2020    EYE SURGERY Bilateral     lasik    GALLBLADDER SURGERY         Family History   Problem  Relation Age of Onset    Heart disease Mother     Diabetes Sister     Hypertension Sister        Social History     Socioeconomic History    Marital status: Single   Tobacco Use    Smoking status: Every Day     Current packs/day: 0.50     Average packs/day: 0.5 packs/day for 40.0 years (20.0 ttl pk-yrs)     Types: Cigarettes    Smokeless tobacco: Never   Vaping Use    Vaping status: Never Used   Substance and Sexual Activity    Alcohol use: Yes     Comment: 2 drinks per week    Drug use: Yes     Types: Marijuana     Comment: avg deepak every 3 weeks, last use over 1 month ago    Sexual activity: Defer           Objective   Physical Exam  Constitutional:       General: He is not in acute distress.     Appearance: He is well-developed. He is not ill-appearing.   HENT:      Head: Normocephalic and atraumatic.   Eyes:      Extraocular Movements: Extraocular movements intact.      Pupils: Pupils are equal, round, and reactive to light.   Neck:      Vascular: No JVD.   Cardiovascular:      Rate and Rhythm: Normal rate and regular rhythm.      Heart sounds: Normal heart sounds. No murmur heard.  Pulmonary:      Effort: No tachypnea, accessory muscle usage or respiratory distress.      Breath sounds: Normal breath sounds. No stridor. No decreased breath sounds, wheezing, rhonchi or rales.   Chest:      Chest wall: No deformity, tenderness or crepitus.   Abdominal:      General: Bowel sounds are normal.      Palpations: Abdomen is soft.      Tenderness: There is guarding. There is no rebound.   Musculoskeletal:         General: Normal range of motion.      Cervical back: Normal range of motion and neck supple.      Right lower leg: No tenderness. No edema.      Left lower leg: No tenderness. No edema.   Lymphadenopathy:      Cervical: No cervical adenopathy.   Skin:     General: Skin is warm and dry.      Coloration: Skin is not cyanotic.      Findings: No ecchymosis or erythema.   Neurological:      General: No focal deficit  present.      Mental Status: He is alert and oriented to person, place, and time.      Cranial Nerves: No cranial nerve deficit.      Motor: No weakness.   Psychiatric:         Mood and Affect: Mood normal. Mood is not anxious.         Behavior: Behavior normal. Behavior is not agitated.         Procedures          ED Course                    No data recorded                 CT Abdomen Pelvis With Contrast    Result Date: 11/12/2024  1. Haziness of the fat adjacent to the pancreas as above. The findings strongly suggest acute pancreatitis. 2. No bowel obstruction or perforation. 3. No acute appendicitis, colitis or diverticulitis.    This report was finalized on 11/12/2024 12:50 AM by Jose Ravi MD.         Results for orders placed or performed during the hospital encounter of 11/11/24   POC Glucose Once    Collection Time: 11/11/24  8:50 PM    Specimen: Blood   Result Value Ref Range    Glucose 116 70 - 130 mg/dL   Comprehensive Metabolic Panel    Collection Time: 11/11/24  9:14 PM    Specimen: Blood   Result Value Ref Range    Glucose 120 (H) 65 - 99 mg/dL    BUN 15 6 - 20 mg/dL    Creatinine 1.13 0.76 - 1.27 mg/dL    Sodium 142 136 - 145 mmol/L    Potassium 4.0 3.5 - 5.2 mmol/L    Chloride 104 98 - 107 mmol/L    CO2 25.5 22.0 - 29.0 mmol/L    Calcium 10.7 (H) 8.6 - 10.5 mg/dL    Total Protein 7.4 6.0 - 8.5 g/dL    Albumin 4.8 3.5 - 5.2 g/dL    ALT (SGPT) 23 1 - 41 U/L    AST (SGOT) 21 1 - 40 U/L    Alkaline Phosphatase 64 39 - 117 U/L    Total Bilirubin 0.4 0.0 - 1.2 mg/dL    Globulin 2.6 gm/dL    A/G Ratio 1.8 g/dL    BUN/Creatinine Ratio 13.3 7.0 - 25.0    Anion Gap 12.5 5.0 - 15.0 mmol/L    eGFR 74.9 >60.0 mL/min/1.73   Lipase    Collection Time: 11/11/24  9:14 PM    Specimen: Blood   Result Value Ref Range    Lipase >3,000 (H) 13 - 60 U/L   Lactic Acid, Plasma    Collection Time: 11/11/24  9:14 PM    Specimen: Blood   Result Value Ref Range    Lactate 1.8 0.5 - 2.0 mmol/L   CBC Auto Differential     Collection Time: 11/11/24  9:14 PM    Specimen: Blood   Result Value Ref Range    WBC 14.55 (H) 3.40 - 10.80 10*3/mm3    RBC 5.50 4.14 - 5.80 10*6/mm3    Hemoglobin 17.5 13.0 - 17.7 g/dL    Hematocrit 54.9 (H) 37.5 - 51.0 %    MCV 99.8 (H) 79.0 - 97.0 fL    MCH 31.8 26.6 - 33.0 pg    MCHC 31.9 31.5 - 35.7 g/dL    RDW 15.9 (H) 12.3 - 15.4 %    RDW-SD 58.6 (H) 37.0 - 54.0 fl    MPV 10.4 6.0 - 12.0 fL    Platelets 232 140 - 450 10*3/mm3    Neutrophil % 71.0 42.7 - 76.0 %    Lymphocyte % 21.8 19.6 - 45.3 %    Monocyte % 6.0 5.0 - 12.0 %    Eosinophil % 0.2 (L) 0.3 - 6.2 %    Basophil % 0.2 0.0 - 1.5 %    Immature Grans % 0.8 (H) 0.0 - 0.5 %    Neutrophils, Absolute 10.33 (H) 1.70 - 7.00 10*3/mm3    Lymphocytes, Absolute 3.17 (H) 0.70 - 3.10 10*3/mm3    Monocytes, Absolute 0.88 0.10 - 0.90 10*3/mm3    Eosinophils, Absolute 0.03 0.00 - 0.40 10*3/mm3    Basophils, Absolute 0.03 0.00 - 0.20 10*3/mm3    Immature Grans, Absolute 0.11 (H) 0.00 - 0.05 10*3/mm3    nRBC 0.0 0.0 - 0.2 /100 WBC   Green Top (Gel)    Collection Time: 11/11/24  9:14 PM   Result Value Ref Range    Extra Tube Hold for add-ons.    Lavender Top    Collection Time: 11/11/24  9:14 PM   Result Value Ref Range    Extra Tube hold for add-on    Gold Top - SST    Collection Time: 11/11/24  9:14 PM   Result Value Ref Range    Extra Tube Hold for add-ons.    Light Blue Top    Collection Time: 11/11/24  9:14 PM   Result Value Ref Range    Extra Tube Hold for add-ons.    Urinalysis With Microscopic If Indicated (No Culture) - Urine, Clean Catch    Collection Time: 11/12/24 12:34 AM    Specimen: Urine, Clean Catch   Result Value Ref Range    Color, UA Yellow Yellow, Straw    Appearance, UA Clear Clear    pH, UA <=5.0 5.0 - 8.0    Specific Gravity, UA >1.030 (H) 1.005 - 1.030    Glucose, UA Negative Negative    Ketones, UA Negative Negative    Bilirubin, UA Negative Negative    Blood, UA Negative Negative    Protein, UA 30 mg/dL (1+) (A) Negative    Leuk Esterase, UA  Negative Negative    Nitrite, UA Negative Negative    Urobilinogen, UA 0.2 E.U./dL 0.2 - 1.0 E.U./dL   Urinalysis, Microscopic Only - Urine, Clean Catch    Collection Time: 11/12/24 12:34 AM    Specimen: Urine, Clean Catch   Result Value Ref Range    RBC, UA None Seen None Seen, 0-2 /HPF    WBC, UA None Seen None Seen, 0-2 /HPF    Bacteria, UA None Seen None Seen /HPF    Squamous Epithelial Cells, UA 0-2 None Seen, 0-2 /HPF    Hyaline Casts, UA None Seen None Seen /LPF    Methodology Manual Light Microscopy                  Medical Decision Making  CBC and CMP are listed.  Elevated lipase.  Concerns for acute pancreatitis.  CT imaging of the abdomen and pelvis confirmed acute pancreatitis.  IV pain medication given.  Recommend admission for further work up and treatment.  Hospitalist team consulted and made aware of the patient.  Consults and orders placed per hospitalist request.  Patient was agreeable to admission plan.  Vitals stable on admission.    Problems Addressed:  Acute pancreatitis, unspecified complication status, unspecified pancreatitis type: complicated acute illness or injury    Amount and/or Complexity of Data Reviewed  Labs: ordered. Decision-making details documented in ED Course.  Radiology: ordered. Decision-making details documented in ED Course.    Risk  OTC drugs.  Prescription drug management.  Decision regarding hospitalization.        Final diagnoses:   Acute pancreatitis, unspecified complication status, unspecified pancreatitis type       ED Disposition  ED Disposition       ED Disposition   Decision to Admit    Condition   --    Comment   Level of Care: Medical Telemetry [23]   Diagnosis: Acute pancreatitis [577.0.ICD-9-CM]   Admitting Physician: DAVID BOND [[id number]   Certification: I Certify That Inpatient Hospital Services Are Medically Necessary For Greater Than 2 Midnights                 No follow-up provider specified.       Medication List        ASK your doctor about  these medications      Testosterone Cypionate 200 MG/ML solution  Ask about: Which instructions should I use?                 Sreedhar Egan DO  11/12/24 0112      Electronically signed by Sreedhar Egan DO at 11/12/24 0112       Tara Fitzpatrick, RN at 11/11/24 2215          Patient asked to urinate for urine sample. Patient advises he will try.     Electronically signed by Tara Fitzpatrick, RN at 11/11/24 2216       Facility-Administered Medications as of 11/12/2024   Medication Dose Route Frequency Provider Last Rate Last Admin    [COMPLETED] aluminum-magnesium hydroxide-simethicone (MAALOX MAX) 400-400-40 MG/5ML suspension 15 mL  15 mL Oral Once Sreedhar Egan DO   15 mL at 11/11/24 2249    sennosides-docusate (PERICOLACE) 8.6-50 MG per tablet 2 tablet  2 tablet Oral BID PRN Dorothy Adams DO        And    polyethylene glycol (MIRALAX) packet 17 g  17 g Oral Daily PRN Dorothy Adams DO        And    bisacodyl (DULCOLAX) EC tablet 5 mg  5 mg Oral Daily PRN Dorothy Adams DO        And    bisacodyl (DULCOLAX) suppository 10 mg  10 mg Rectal Daily PRN Dorothy Adams DO        Brexpiprazole tablet 1 mg  1 mg Oral Daily Dorothy Adams DO   1 mg at 11/12/24 0924    buPROPion XL (WELLBUTRIN XL) 24 hr tablet 300 mg  300 mg Oral Daily Dorothy Adams DO   300 mg at 11/12/24 0924    cholestyramine (QUESTRAN) packet 1 packet  1 packet Oral Q12H Valentina Issa PA-C        [Held by provider] Darunavir-Cobicistat (PREZCOBIX) 800-150 MG per tablet 1 tablet  1 tablet Oral Daily Dorothy Adams DO        dextrose (D50W) (25 g/50 mL) IV injection 25 g  25 g Intravenous Q15 Min PRN Dorothy Adams DO        dextrose (GLUTOSE) oral gel 15 g  15 g Oral Q15 Min PRN Dorothy Adams DO        [Held by provider] dolutegravir (TIVICAY) tablet 50 mg  50 mg Oral Daily Dorothy Adams DO        [Held by provider] Doravirine tablet 100 mg  100 mg Oral Daily Dorothy Adams  Niko,         famotidine (PEPCID) tablet 20 mg  20 mg Oral Nightly PRN Dorothy Adams DO        finasteride (PROSCAR) tablet 5 mg  5 mg Oral Daily Dorothy Adams DO   5 mg at 11/12/24 0928    [Held by provider] Fostemsavir Tromethamine ER tablet sustained-release 12 hour 600 mg  600 mg Oral BID Dorothy Adams DO        glucagon HCl (Diagnostic) injection 1 mg  1 mg Subcutaneous Q15 Min PRN Dorothy Adams DO        heparin (porcine) 5000 UNIT/ML injection 5,000 Units  5,000 Units Subcutaneous Q12H Dorothy Adams DO   5,000 Units at 11/12/24 0930    HYDROmorphone (DILAUDID) injection 0.5 mg  0.5 mg Intravenous Q3H PRN Dorothy Adams DO   0.5 mg at 11/12/24 0937    [COMPLETED] HYDROmorphone (DILAUDID) injection 0.5 mg  0.5 mg Intravenous Once Sreedhar Egan DO   0.5 mg at 11/12/24 0149    [COMPLETED] HYDROmorphone (DILAUDID) injection 1 mg  1 mg Intravenous Once Sreedhar Egan, DO   1 mg at 11/11/24 2249    [COMPLETED] HYDROmorphone (DILAUDID) injection 1 mg  1 mg Intravenous Once Sreedhar Egan DO   1 mg at 11/12/24 0032    [COMPLETED] iopamidol (ISOVUE-300) 61 % injection 100 mL  100 mL Intravenous Once in imaging Sreedhar Egan DO   87 mL at 11/11/24 2321    [COMPLETED] ketorolac (TORADOL) injection 30 mg  30 mg Intravenous Once Sreedhar Egan, DO   30 mg at 11/11/24 2249    [COMPLETED] Lidocaine Viscous HCl (XYLOCAINE) 2 % solution 15 mL  15 mL Mouth/Throat Once Sreedhar Egan, DO   15 mL at 11/11/24 2249    LORazepam (ATIVAN) tablet 1 mg  1 mg Oral Q8H PRN Droothy Adams, DO        nitroglycerin (NITROSTAT) SL tablet 0.4 mg  0.4 mg Sublingual Q5 Min PRN Dorothy Adams DO        [COMPLETED] ondansetron (ZOFRAN) injection 4 mg  4 mg Intravenous Once Sreedhar Egan DO   4 mg at 11/11/24 3469    ondansetron (ZOFRAN) injection 4 mg  4 mg Intravenous Q6H PRN Dorothy Adams DO   4 mg at 11/12/24 2562    oxyCODONE (ROXICODONE) immediate release  tablet 5 mg  5 mg Oral Q4H PRN Deejay Vaughan MD        [COMPLETED] PB-Hyoscy-Atropine-Scopolamine (DONNATAL) per tablet 32.4 mg  2 tablet Oral Once Sreedhar Egan DO   32.4 mg at 11/11/24 2249    sodium chloride 0.9 % flush 10 mL  10 mL Intravenous PRN Dorothy Adams DO        sodium chloride 0.9 % flush 10 mL  10 mL Intravenous Q12H Dorothy Adams DO   10 mL at 11/12/24 0929    sodium chloride 0.9 % flush 10 mL  10 mL Intravenous PRN Dorothy Adams DO        sodium chloride 0.9 % infusion 40 mL  40 mL Intravenous PRN Dorothy Adams DO        sodium chloride 0.9 % infusion  100 mL/hr Intravenous Continuous Dorothy Adams  mL/hr at 11/12/24 0410 100 mL/hr at 11/12/24 0410    Testosterone 20.25 MG/1.25GM (1.62%) gel 40.5 mg  2 Application Transdermal Daily Dorothy Adams DO        [START ON 11/13/2024] Testosterone Cypionate (DEPOTESTOTERONE CYPIONATE) injection 200 mg  200 mg Intramuscular Q14 Days Dorothy Adams DO        triamcinolone (KENALOG) 0.1 % cream 1 Application  1 Application Topical BID Dorothy Adams DO   1 Application at 11/12/24 0929     Orders (all)        Start     Ordered    11/13/24 0900  Testosterone Cypionate (DEPOTESTOTERONE CYPIONATE) injection 200 mg  Every 14 Days         11/12/24 0457    11/12/24 1132  POC Glucose Once  PROCEDURE ONCE        Comments: Complete no more than 45 minutes prior to patient eating      11/12/24 1123    11/12/24 0948  ECG 12 Lead Rhythm Change  STAT         11/12/24 0947    11/12/24 0900  sodium chloride 0.9 % flush 10 mL  Every 12 Hours Scheduled         11/12/24 0337    11/12/24 0900  heparin (porcine) 5000 UNIT/ML injection 5,000 Units  Every 12 Hours Scheduled         11/12/24 0337    11/12/24 0900  Brexpiprazole tablet 1 mg  Daily         11/12/24 0457    11/12/24 0900  buPROPion XL (WELLBUTRIN XL) 24 hr tablet 300 mg  Daily         11/12/24 0457    11/12/24 0900  cholestyramine light packet 4  g  Every 12 Hours Scheduled,   Status:  Discontinued         11/12/24 0457 11/12/24 0900  finasteride (PROSCAR) tablet 5 mg  Daily         11/12/24 0457 11/12/24 0900  triamcinolone (KENALOG) 0.1 % cream 1 Application  2 Times Daily         11/12/24 0457 11/12/24 0900  Testosterone 20.25 MG/1.25GM (1.62%) gel 40.5 mg  Daily         11/12/24 0457 11/12/24 0900  [Held by provider]  Doravirine tablet 100 mg  Daily        (On hold since today at 0457 until manually unheld; held by Dorothy Adams DOHold Reason: Other (Comment Required)Hold Comments: May have friend bring home supply)    11/12/24 0457 11/12/24 0900  [Held by provider]  Darunavir-Cobicistat (PREZCOBIX) 800-150 MG per tablet 1 tablet  Daily        (On hold since today at 0457 until manually unheld; held by Dorothy Adams DOHold Reason: Other (Comment Required)Hold Comments: May have friend bring home supply)    11/12/24 0457 11/12/24 0900  [Held by provider]  dolutegravir (TIVICAY) tablet 50 mg  Daily        (On hold since today at 0457 until manually unheld; held by Dorothy Adams DOHold Reason: Other (Comment Required)Hold Comments: May have friend bring home supply)    11/12/24 0457 11/12/24 0900  [Held by provider]  Fostemsavir Tromethamine ER tablet sustained-release 12 hour 600 mg  2 Times Daily        (On hold since today at 0457 until manually unheld; held by Dorothy Adams DOHold Reason: Other (Comment Required)Hold Comments: May have friend bring home supply)    11/12/24 0457 11/12/24 0900  cholestyramine (QUESTRAN) packet 1 packet  Every 12 Hours Scheduled         11/12/24 0508    11/12/24 0846  NPO Diet NPO Type: Sips with Meds, Ice Chips  Diet Effective Now         11/12/24 0845    11/12/24 0845  oxyCODONE (ROXICODONE) immediate release tablet 5 mg  Every 4 Hours PRN         11/12/24 0845    11/12/24 0800  Oral Care  2 Times Daily       11/12/24 0337    11/12/24 0717  Initiate & Follow  Hypercapnic Monitoring Guideline for Opioid Administration via EtCO2 and / or SpO2  Continuous        Comments: Follow Hypercapnic Monitoring Guideline As Outlined in Process Instructions (Open Order Report to View Full Instructions)    11/12/24 0716 11/12/24 0717  Opioid Administration - Document EtCO2 and / or SpO2 With Each Set of Vitals & Any Change in Patient Status  Per Order Details        Comments: With Each Set of Vitals & Any Change in Patient Status    11/12/24 0716    11/12/24 0717  Opioid Administration - Notify Provider Hypercapnic Monitoring  Continuous        Comments: Open Order Report to View Parameters Requiring Provider Notification    11/12/24 0716    11/12/24 0717  Opioid Administration - Continuous Pulse Oximetry (SpO2)  Continuous         11/12/24 0716    11/12/24 0704  POC Glucose Once  PROCEDURE ONCE        Comments: Complete no more than 45 minutes prior to patient eating      11/12/24 0656    11/12/24 0600  CBC & Differential  Morning Draw         11/12/24 0337    11/12/24 0600  Comprehensive Metabolic Panel  Morning Draw         11/12/24 0337    11/12/24 0600  POC Glucose Finger Q6H  Every 6 Hours      Comments: Complete no more than 45 minutes prior to patient eating      11/12/24 0337    11/12/24 0600  CBC Auto Differential  PROCEDURE ONCE         11/12/24 0337    11/12/24 0448  famotidine (PEPCID) tablet 20 mg  Nightly PRN         11/12/24 0457    11/12/24 0448  LORazepam (ATIVAN) tablet 1 mg  Every 8 Hours PRN         11/12/24 0457    11/12/24 0430  sodium chloride 0.9 % infusion  Continuous         11/12/24 0337    11/12/24 0400  Vital Signs  Every 4 Hours      Comments: Per per hospital policy    11/12/24 0337 11/12/24 0339  Fentanyl, Urine - Urine, Clean Catch  Once         11/12/24 0338    11/12/24 0337  Notify Physician (with Parameters)  Until Discontinued         11/12/24 0337 11/12/24 0337  Intake & Output  Every Shift       11/12/24 0337 11/12/24 0337  Weigh  "patient  Once         11/12/24 0337 11/12/24 0337  Urine Drug Screen - Urine, Clean Catch  STAT         11/12/24 0337 11/12/24 0337  Insert Peripheral IV  Once         11/12/24 0337 11/12/24 0337  Saline Lock & Maintain IV Access  Continuous,   Status:  Canceled         11/12/24 0337 11/12/24 0337  Continuous Cardiac Monitoring  Continuous        Comments: Follow Standing Orders As Outlined in Process Instructions (Open Order Report to View Full Instructions)    11/12/24 0337 11/12/24 0337  Maintain IV Access  Continuous         11/12/24 0337 11/12/24 0337  Telemetry - Place Orders & Notify Provider of Results When Patient Experiences Acute Chest Pain, Dysrhythmia or Respiratory Distress  Continuous        Comments: Open Order Report to View Parameters Requiring Provider Notification    11/12/24 0337 11/12/24 0337  May Be Off Telemetry for Tests  Continuous         11/12/24 0337 11/12/24 0337  NPO Diet NPO Type: Strict NPO  Diet Effective Now,   Status:  Canceled         11/12/24 0337 11/12/24 0337  Triglycerides  Once         11/12/24 0337 11/12/24 0337  ECG 12 Lead QT Measurement  Once         11/12/24 0337 11/12/24 0336  sennosides-docusate (PERICOLACE) 8.6-50 MG per tablet 2 tablet  2 Times Daily PRN        Placed in \"And\" Linked Group    11/12/24 0337 11/12/24 0336  polyethylene glycol (MIRALAX) packet 17 g  Daily PRN        Placed in \"And\" Linked Group    11/12/24 0337 11/12/24 0336  bisacodyl (DULCOLAX) EC tablet 5 mg  Daily PRN        Placed in \"And\" Linked Group    11/12/24 0337 11/12/24 0336  bisacodyl (DULCOLAX) suppository 10 mg  Daily PRN        Placed in \"And\" Linked Group    11/12/24 0337 11/12/24 0336  dextrose (GLUTOSE) oral gel 15 g  Every 15 Minutes PRN         11/12/24 0337 11/12/24 0336  dextrose (D50W) (25 g/50 mL) IV injection 25 g  Every 15 Minutes PRN         11/12/24 0337    11/12/24 0336  glucagon HCl (Diagnostic) injection 1 mg  Every 15 " Minutes PRN         11/12/24 0337    11/12/24 0336  HYDROmorphone (DILAUDID) injection 0.5 mg  Every 3 Hours PRN         11/12/24 0337    11/12/24 0336  ondansetron (ZOFRAN) injection 4 mg  Every 6 Hours PRN         11/12/24 0337    11/12/24 0336  sodium chloride 0.9 % flush 10 mL  As Needed         11/12/24 0337    11/12/24 0336  sodium chloride 0.9 % infusion 40 mL  As Needed         11/12/24 0337    11/12/24 0336  nitroglycerin (NITROSTAT) SL tablet 0.4 mg  Every 5 Minutes PRN         11/12/24 0337    11/12/24 0157  HYDROmorphone (DILAUDID) injection 0.5 mg  Once         11/12/24 0141    11/12/24 0109  Inpatient Admission  Once         11/12/24 0110    11/12/24 0109  Code Status and Medical Interventions: CPR (Attempt to Resuscitate); Full Support  Continuous         11/12/24 0110    11/12/24 0106  Ethanol  Once         11/12/24 0105    11/12/24 0045  HYDROmorphone (DILAUDID) injection 1 mg  Once         11/12/24 0029    11/12/24 0044  Urinalysis, Microscopic Only - Urine, Clean Catch  Once         11/12/24 0043    11/11/24 2337  iopamidol (ISOVUE-300) 61 % injection 100 mL  Once in Imaging         11/11/24 2321    11/11/24 2234  HYDROmorphone (DILAUDID) injection 1 mg  Once         11/11/24 2218    11/11/24 2227  morphine injection 4 mg  Once,   Status:  Discontinued         11/11/24 2211 11/11/24 2227  ketorolac (TORADOL) injection 30 mg  Once         11/11/24 2211 11/11/24 2227  aluminum-magnesium hydroxide-simethicone (MAALOX MAX) 400-400-40 MG/5ML suspension 15 mL  Once         11/11/24 2211 11/11/24 2227  Lidocaine Viscous HCl (XYLOCAINE) 2 % solution 15 mL  Once         11/11/24 2211 11/11/24 2227  PB-Hyoscy-Atropine-Scopolamine (DONNATAL) per tablet 32.4 mg  Once         11/11/24 2211 11/11/24 2227  ondansetron (ZOFRAN) injection 4 mg  Once         11/11/24 2211 11/11/24 2207  CT Abdomen Pelvis With Contrast  1 Time Imaging        Comments: IV CONTRAST ONLY      11/11/24 2206 11/11/24  2058  POC Glucose Once  PROCEDURE ONCE        Comments: Complete no more than 45 minutes prior to patient eating      11/11/24 2050 11/11/24 2048  Insert Peripheral IV  Once         11/11/24 2047 11/11/24 2048  [place] Draw  Once         11/11/24 2047 11/11/24 2048  CBC & Differential  Once         11/11/24 2047 11/11/24 2048  Comprehensive Metabolic Panel  Once         11/11/24 2047 11/11/24 2048  Lipase  Once         11/11/24 2047 11/11/24 2048  Urinalysis With Microscopic If Indicated (No Culture) - Urine, Clean Catch  STAT         11/11/24 2047 11/11/24 2048  Lactic Acid, Plasma  Once         11/11/24 2047 11/11/24 2048  Green Top (Gel)  PROCEDURE ONCE         11/11/24 2048 11/11/24 2048  Lavender Top  PROCEDURE ONCE         11/11/24 2048 11/11/24 2048  Gold Top - SST  PROCEDURE ONCE         11/11/24 2048 11/11/24 2048  Light Blue Top  PROCEDURE ONCE         11/11/24 2048 11/11/24 2048  CBC Auto Differential  PROCEDURE ONCE         11/11/24 2048 11/11/24 2047  sodium chloride 0.9 % flush 10 mL  As Needed         11/11/24 2047 11/11/24 0000  Telemetry Scan  Once         11/11/24 0000    11/11/24 0000  Telemetry Scan  Once         11/11/24 0000    11/11/24 0000  Telemetry Scan  Once         11/11/24 0000    Unscheduled  Up With Assistance  As Needed       11/12/24 0337    Unscheduled  Follow Hypoglycemia Standing Orders For Blood Glucose <70 & Notify Provider of Treatment  As Needed      Comments: Follow Hypoglycemia Orders As Outlined in Process Instructions (Open Order Report to View Full Instructions)  Notify Provider Any Time Hypoglycemia Treatment is Administered    11/12/24 0337    --  Brexpiprazole (Rexulti) 1 MG tablet  Daily         11/12/24 0018    --  buPROPion XL (WELLBUTRIN XL) 300 MG 24 hr tablet  Daily         11/12/24 0018    --  colesevelam (WELCHOL) 3.75 g pack pack  Daily         11/12/24 0018    --  Darunavir-Cobicistat (PREZCOBIX) 800-150 MG per tablet   Daily         11/12/24 0018    --  diclofenac (VOLTAREN) 75 MG EC tablet  2 Times Daily         11/12/24 0018    --  dolutegravir (TIVICAY) 50 MG tablet  Daily         11/12/24 0018    --  Doravirine (Pifeltro) 100 MG tablet  Daily         11/12/24 0018    --  dutasteride (AVODART) 0.5 MG capsule  Daily         11/12/24 0018    --  famotidine (PEPCID) 20 MG tablet  Nightly PRN         11/12/24 0018    --  Fostemsavir Tromethamine  MG tablet sustained-release 12 hour  2 Times Daily         11/12/24 0018    --  Testosterone Cypionate 200 MG/ML solution  Every 14 Days         11/12/24 0020    --  triamcinolone (KENALOG) 0.1 % cream  2 Times Daily         11/12/24 0020    --  Testosterone 20.25 MG/1.25GM (1.62%) gel  Daily         11/12/24 0359                  Physician Progress Notes (all)    No notes of this type exist for this encounter.       Consult Notes (all)    No notes of this type exist for this encounter.

## 2024-11-12 NOTE — PLAN OF CARE
Goal Outcome Evaluation:   Pt resting in bed. VSS. No acute s/s of distress noted. Pain and n/v controlled with meds. Pt has refused glucose finger sticks this evening. SpO2 maintained >90%. IV access maintained. POC ongoing.

## 2024-11-13 LAB — GLUCOSE BLDC GLUCOMTR-MCNC: 128 MG/DL (ref 70–130)

## 2024-11-13 PROCEDURE — 25010000002 HYDROMORPHONE PER 4 MG

## 2024-11-13 PROCEDURE — 25010000002 ONDANSETRON PER 1 MG: Performed by: INTERNAL MEDICINE

## 2024-11-13 PROCEDURE — 25010000002 THIAMINE HCL 200 MG/2ML SOLUTION: Performed by: STUDENT IN AN ORGANIZED HEALTH CARE EDUCATION/TRAINING PROGRAM

## 2024-11-13 PROCEDURE — 82948 REAGENT STRIP/BLOOD GLUCOSE: CPT

## 2024-11-13 PROCEDURE — 99233 SBSQ HOSP IP/OBS HIGH 50: CPT | Performed by: INTERNAL MEDICINE

## 2024-11-13 PROCEDURE — 25010000002 HEPARIN (PORCINE) PER 1000 UNITS: Performed by: INTERNAL MEDICINE

## 2024-11-13 RX ADMIN — THIAMINE HYDROCHLORIDE 200 MG: 100 INJECTION, SOLUTION INTRAMUSCULAR; INTRAVENOUS at 16:47

## 2024-11-13 RX ADMIN — Medication 10 ML: at 09:49

## 2024-11-13 RX ADMIN — OXYCODONE 5 MG: 5 TABLET ORAL at 08:43

## 2024-11-13 RX ADMIN — SENNOSIDES AND DOCUSATE SODIUM 2 TABLET: 50; 8.6 TABLET ORAL at 22:07

## 2024-11-13 RX ADMIN — Medication 10 ML: at 22:07

## 2024-11-13 RX ADMIN — HYDROMORPHONE HYDROCHLORIDE 0.25 MG: 1 INJECTION, SOLUTION INTRAMUSCULAR; INTRAVENOUS; SUBCUTANEOUS at 09:49

## 2024-11-13 RX ADMIN — HEPARIN SODIUM 5000 UNITS: 5000 INJECTION INTRAVENOUS; SUBCUTANEOUS at 08:40

## 2024-11-13 RX ADMIN — THIAMINE HYDROCHLORIDE 200 MG: 100 INJECTION, SOLUTION INTRAMUSCULAR; INTRAVENOUS at 22:07

## 2024-11-13 RX ADMIN — BUPROPION HYDROCHLORIDE 300 MG: 150 TABLET, EXTENDED RELEASE ORAL at 08:40

## 2024-11-13 RX ADMIN — TRIAMCINOLONE ACETONIDE 1 APPLICATION: 1 CREAM TOPICAL at 22:07

## 2024-11-13 RX ADMIN — ONDANSETRON 4 MG: 2 INJECTION INTRAMUSCULAR; INTRAVENOUS at 12:32

## 2024-11-13 RX ADMIN — HYDROMORPHONE HYDROCHLORIDE 0.25 MG: 1 INJECTION, SOLUTION INTRAMUSCULAR; INTRAVENOUS; SUBCUTANEOUS at 16:48

## 2024-11-13 RX ADMIN — THIAMINE HYDROCHLORIDE 200 MG: 100 INJECTION, SOLUTION INTRAMUSCULAR; INTRAVENOUS at 06:27

## 2024-11-13 RX ADMIN — FOLIC ACID 1 MG: 5 INJECTION, SOLUTION INTRAMUSCULAR; INTRAVENOUS; SUBCUTANEOUS at 09:49

## 2024-11-13 RX ADMIN — HEPARIN SODIUM 5000 UNITS: 5000 INJECTION INTRAVENOUS; SUBCUTANEOUS at 22:06

## 2024-11-13 RX ADMIN — FINASTERIDE 5 MG: 5 TABLET, FILM COATED ORAL at 08:40

## 2024-11-13 RX ADMIN — BREXPIPRAZOLE 1 MG: 1 TABLET ORAL at 08:40

## 2024-11-13 RX ADMIN — HYDROMORPHONE HYDROCHLORIDE 0.25 MG: 1 INJECTION, SOLUTION INTRAMUSCULAR; INTRAVENOUS; SUBCUTANEOUS at 22:14

## 2024-11-13 RX ADMIN — CHOLESTYRAMINE 1 PACKET: 4 POWDER, FOR SUSPENSION ORAL at 09:45

## 2024-11-13 NOTE — PLAN OF CARE
Goal Outcome Evaluation:  Progress: no change   Pt resting in bed, watching television. Pt has tolerated all interventions. No complaints/concerns. No acute distress noted. Call light within reach. Plan of care ongoing.

## 2024-11-13 NOTE — PROGRESS NOTES
[facility]     Patient Identification:  Name:  Jag Bueno  Age:  59 y.o.  Sex:  male  :  1965  MRN:  [id number]  Visit Number:  [id number]  Primary Care Provider:  Lorin Deras MD    Length of stay:  1        Subjective: Seen and evaluated with the nursing staff.  Reports improvement in his pain.  He now rates his pain a 6/10.  Does not feel nauseated.  ----------------------------------------------------------------------------------------------------------------------  Current Hospital Meds:  Brexpiprazole, 1 mg, Oral, Daily  buPROPion XL, 300 mg, Oral, Daily  cholestyramine, 1 packet, Oral, Q12H  [Held by provider] Darunavir-Cobicistat, 1 tablet, Oral, Daily  [Held by provider] dolutegravir, 50 mg, Oral, Daily  [Held by provider] Doravirine, 100 mg, Oral, Daily  finasteride, 5 mg, Oral, Daily  folic acid 1 mg in sodium chloride 0.9 % 50 mL IVPB, 1 mg, Intravenous, Daily  [Held by provider] Fostemsavir Tromethamine ER, 600 mg, Oral, BID  heparin (porcine), 5,000 Units, Subcutaneous, Q12H  sodium chloride, 10 mL, Intravenous, Q12H  Testosterone, 2 Application, Transdermal, Daily  Testosterone Cypionate, 200 mg, Intramuscular, Q14 Days  thiamine (B-1) IV, 200 mg, Intravenous, Q8H   Followed by  [START ON 2024] thiamine, 100 mg, Oral, Daily  triamcinolone, 1 Application, Topical, BID         ----------------------------------------------------------------------------------------------------------------------  Vital Signs:  Temp:  [98.6 °F (37 °C)-100.2 °F (37.9 °C)] 98.6 °F (37 °C)  Heart Rate:  [] 97  Resp:  [18] 18  BP: (108-135)/(68-83) 110/70      24  2043 24  0351 24  0500   Weight: 92.1 kg (203 lb) 88.7 kg (195 lb 8 oz) 89.6 kg (197 lb 8 oz)     Body mass index is 26.06 kg/m².    Intake/Output Summary (Last 24 hours) at 2024 1223  Last data filed at 2024 1133  Gross per 24 hour   Intake 240 ml   Output 0 ml    Net 240 ml     Diet: Liquid; Clear Liquid; Fluid Consistency: Thin (IDDSI 0)  ----------------------------------------------------------------------------------------------------------------------  Physical exam:  Constitutional: Middle aged man, he does not seem to be in acute distress at time of evaluation.        HENT:  Head:  Normocephalic and atraumatic.  Mouth:  Moist mucous membranes.    Eyes:  Conjunctivae and EOM are normal.  Pupils are equal, round, and reactive to light.  No scleral icterus.    Neck:  Neck supple.  No JVD present.    Cardiovascular:  Normal rate, regular rhythm and normal heart sounds with no murmur.  Pulmonary/Chest:  No respiratory distress, no wheezes, no crackles, with normal breath sounds and good air movement.  Abdominal: Full with very mild epigastric pain, no guarding and no rebound.  Bowel sounds are normal.    Musculoskeletal:  No edema, no tenderness, and no deformity.  No red or swollen joints anywhere.    Neurological:  Alert and oriented to person, place, and time.  No cranial nerve deficit.  No tongue deviation.  No facial droop.  No slurred speech.   Skin:  Skin is warm and dry. No rash noted. No pallor.   Peripheral vascular:  Strong pulses in all 4 extremities with no clubbing, no cyanosis, no edema.  Genitourinary: Deferred  ----------------------------------------------------------------------------------------------------------------------  Tele:    ----------------------------------------------------------------------------------------------------------------------      Results from last 7 days   Lab Units 11/12/24 0429 11/11/24 2114   LACTATE mmol/L  --  1.8   WBC 10*3/mm3 13.87* 14.55*   HEMOGLOBIN g/dL 16.3 17.5   HEMATOCRIT % 50.8 54.9*   MCV fL 101.8* 99.8*   MCHC g/dL 32.1 31.9   PLATELETS 10*3/mm3 187 232         Results from last 7 days   Lab Units 11/12/24 0429 11/11/24 2114   SODIUM mmol/L 136 142   POTASSIUM mmol/L 4.8 4.0   CHLORIDE mmol/L 102 104  "  CO2 mmol/L 24.3 25.5   BUN mg/dL 16 15   CREATININE mg/dL 1.09 1.13   CALCIUM mg/dL 9.9 10.7*   GLUCOSE mg/dL 125* 120*   ALBUMIN g/dL 4.3 4.8   BILIRUBIN mg/dL 0.6 0.4   ALK PHOS U/L 61 64   AST (SGOT) U/L 18 21   ALT (SGPT) U/L 20 23   Estimated Creatinine Clearance: 92.5 mL/min (by C-G formula based on SCr of 1.09 mg/dL).    No results found for: \"AMMONIA\"  Results from last 7 days   Lab Units 11/11/24  2114   TRIGLYCERIDES mg/dL 338*     No results found for: \"BLOODCX\"  No results found for: \"URINECX\"  No results found for: \"WOUNDCX\"  No results found for: \"STOOLCX\"    I have personally looked at the labs and they are summarized above.  ----------------------------------------------------------------------------------------------------------------------  Imaging Results (Last 24 Hours)       ** No results found for the last 24 hours. **          ----------------------------------------------------------------------------------------------------------------------  Assessment and Plan:  # Acute pancreatitis present on admission  Exact etiology not known.  Patient reports minimal alcohol use.  Is has multiple medications that may induce acute pancreatitis.  He does not have any gallbladder.  Patient pain seems to be improving.  Start patient on clear liquid diet.  Will advance as tolerated.  Continue on IV fluid  Continue IV pain medication    # HIV.  Will resume home medications as soon as patient is able to tolerate clear liquid diet.    # Macrocytosis.    # Hypertriglyceridemia  Patient triglyceride level is about 300.  This level may not be enough to induce acute pancreatitis.  Managed with diet and lifestyle modification.  Will need to follow-up with PCP outpatient for further management      Chronic medical problems:  Anxiety  Sleep apnea      Prophylaxis:  DVT-heparin    Disposition:  Anticipate discharge in the next 1 to 2 days if pain is adequately controlled and patient tolerates p.o.  I discussed the " plan of care with the patient and nursing staff.      Aaron Miller MD  11/13/24  12:23 EST    Dragon disclaimer:  Part of this note may be an electronic transcription/translation of spoken language to printed text using the Dragon Dictation System.

## 2024-11-13 NOTE — PLAN OF CARE
Pt resting in bed at this time, pt is A/O X4 and on RA. Pt had complaints of pain and nausea on shift see MAR for interventions. No other complaints or distress noted. VSS afebrile. Plan of care ongoing.

## 2024-11-14 PROBLEM — D72.829 LEUCOCYTOSIS: Status: ACTIVE | Noted: 2024-11-14

## 2024-11-14 PROBLEM — E87.1 HYPONATREMIA: Status: ACTIVE | Noted: 2024-11-14

## 2024-11-14 LAB
ALBUMIN SERPL-MCNC: 3.7 G/DL (ref 3.5–5.2)
ALBUMIN/GLOB SERPL: 1.3 G/DL
ALP SERPL-CCNC: 62 U/L (ref 39–117)
ALT SERPL W P-5'-P-CCNC: 11 U/L (ref 1–41)
ANION GAP SERPL CALCULATED.3IONS-SCNC: 11.6 MMOL/L (ref 5–15)
AST SERPL-CCNC: 16 U/L (ref 1–40)
BASOPHILS # BLD AUTO: 0.02 10*3/MM3 (ref 0–0.2)
BASOPHILS NFR BLD AUTO: 0.1 % (ref 0–1.5)
BILIRUB SERPL-MCNC: 0.8 MG/DL (ref 0–1.2)
BUN SERPL-MCNC: 8 MG/DL (ref 6–20)
BUN/CREAT SERPL: 9.2 (ref 7–25)
CALCIUM SPEC-SCNC: 9.6 MG/DL (ref 8.6–10.5)
CHLORIDE SERPL-SCNC: 95 MMOL/L (ref 98–107)
CO2 SERPL-SCNC: 22.4 MMOL/L (ref 22–29)
CREAT SERPL-MCNC: 0.87 MG/DL (ref 0.76–1.27)
DEPRECATED RDW RBC AUTO: 57.1 FL (ref 37–54)
EGFRCR SERPLBLD CKD-EPI 2021: 99.4 ML/MIN/1.73
EOSINOPHIL # BLD AUTO: 0.05 10*3/MM3 (ref 0–0.4)
EOSINOPHIL NFR BLD AUTO: 0.3 % (ref 0.3–6.2)
ERYTHROCYTE [DISTWIDTH] IN BLOOD BY AUTOMATED COUNT: 15.3 % (ref 12.3–15.4)
GLOBULIN UR ELPH-MCNC: 2.9 GM/DL
GLUCOSE BLDC GLUCOMTR-MCNC: 135 MG/DL (ref 70–130)
GLUCOSE SERPL-MCNC: 114 MG/DL (ref 65–99)
HCT VFR BLD AUTO: 47.6 % (ref 37.5–51)
HGB BLD-MCNC: 15.5 G/DL (ref 13–17.7)
IMM GRANULOCYTES # BLD AUTO: 0.08 10*3/MM3 (ref 0–0.05)
IMM GRANULOCYTES NFR BLD AUTO: 0.5 % (ref 0–0.5)
LYMPHOCYTES # BLD AUTO: 2.24 10*3/MM3 (ref 0.7–3.1)
LYMPHOCYTES NFR BLD AUTO: 14.3 % (ref 19.6–45.3)
MCH RBC QN AUTO: 32.6 PG (ref 26.6–33)
MCHC RBC AUTO-ENTMCNC: 32.6 G/DL (ref 31.5–35.7)
MCV RBC AUTO: 100 FL (ref 79–97)
MONOCYTES # BLD AUTO: 1.2 10*3/MM3 (ref 0.1–0.9)
MONOCYTES NFR BLD AUTO: 7.7 % (ref 5–12)
NEUTROPHILS NFR BLD AUTO: 12.03 10*3/MM3 (ref 1.7–7)
NEUTROPHILS NFR BLD AUTO: 77.1 % (ref 42.7–76)
NRBC BLD AUTO-RTO: 0 /100 WBC (ref 0–0.2)
PLATELET # BLD AUTO: 178 10*3/MM3 (ref 140–450)
PMV BLD AUTO: 10.8 FL (ref 6–12)
POTASSIUM SERPL-SCNC: 4.1 MMOL/L (ref 3.5–5.2)
PROCALCITONIN SERPL-MCNC: 0.13 NG/ML (ref 0–0.25)
PROT SERPL-MCNC: 6.6 G/DL (ref 6–8.5)
RBC # BLD AUTO: 4.76 10*6/MM3 (ref 4.14–5.8)
SODIUM SERPL-SCNC: 129 MMOL/L (ref 136–145)
WBC NRBC COR # BLD AUTO: 15.62 10*3/MM3 (ref 3.4–10.8)

## 2024-11-14 PROCEDURE — 25010000002 HYDROMORPHONE PER 4 MG

## 2024-11-14 PROCEDURE — 25010000002 HEPARIN (PORCINE) PER 1000 UNITS: Performed by: INTERNAL MEDICINE

## 2024-11-14 PROCEDURE — 85025 COMPLETE CBC W/AUTO DIFF WBC: CPT | Performed by: INTERNAL MEDICINE

## 2024-11-14 PROCEDURE — 84145 PROCALCITONIN (PCT): CPT | Performed by: INTERNAL MEDICINE

## 2024-11-14 PROCEDURE — 82948 REAGENT STRIP/BLOOD GLUCOSE: CPT

## 2024-11-14 PROCEDURE — 25010000002 THIAMINE HCL 200 MG/2ML SOLUTION: Performed by: STUDENT IN AN ORGANIZED HEALTH CARE EDUCATION/TRAINING PROGRAM

## 2024-11-14 PROCEDURE — 99233 SBSQ HOSP IP/OBS HIGH 50: CPT | Performed by: INTERNAL MEDICINE

## 2024-11-14 PROCEDURE — 80053 COMPREHEN METABOLIC PANEL: CPT | Performed by: INTERNAL MEDICINE

## 2024-11-14 RX ORDER — SODIUM CHLORIDE 1 G/1
2 TABLET ORAL 2 TIMES DAILY WITH MEALS
Status: DISCONTINUED | OUTPATIENT
Start: 2024-11-14 | End: 2024-11-15 | Stop reason: HOSPADM

## 2024-11-14 RX ORDER — LACTULOSE 10 G/15ML
20 SOLUTION ORAL DAILY
Status: DISCONTINUED | OUTPATIENT
Start: 2024-11-14 | End: 2024-11-15 | Stop reason: HOSPADM

## 2024-11-14 RX ADMIN — FINASTERIDE 5 MG: 5 TABLET, FILM COATED ORAL at 08:58

## 2024-11-14 RX ADMIN — Medication 2 G: at 17:22

## 2024-11-14 RX ADMIN — LACTULOSE 20 G: 20 SOLUTION ORAL at 17:22

## 2024-11-14 RX ADMIN — Medication 10 ML: at 21:15

## 2024-11-14 RX ADMIN — FOLIC ACID 1 MG: 5 INJECTION, SOLUTION INTRAMUSCULAR; INTRAVENOUS; SUBCUTANEOUS at 09:00

## 2024-11-14 RX ADMIN — HYDROMORPHONE HYDROCHLORIDE 0.25 MG: 1 INJECTION, SOLUTION INTRAMUSCULAR; INTRAVENOUS; SUBCUTANEOUS at 12:35

## 2024-11-14 RX ADMIN — THIAMINE HYDROCHLORIDE 200 MG: 100 INJECTION, SOLUTION INTRAMUSCULAR; INTRAVENOUS at 14:29

## 2024-11-14 RX ADMIN — THIAMINE HYDROCHLORIDE 200 MG: 100 INJECTION, SOLUTION INTRAMUSCULAR; INTRAVENOUS at 21:14

## 2024-11-14 RX ADMIN — BREXPIPRAZOLE 1 MG: 1 TABLET ORAL at 08:58

## 2024-11-14 RX ADMIN — TRIAMCINOLONE ACETONIDE 1 APPLICATION: 1 CREAM TOPICAL at 21:14

## 2024-11-14 RX ADMIN — BUPROPION HYDROCHLORIDE 300 MG: 150 TABLET, EXTENDED RELEASE ORAL at 08:58

## 2024-11-14 RX ADMIN — SENNOSIDES AND DOCUSATE SODIUM 2 TABLET: 50; 8.6 TABLET ORAL at 09:15

## 2024-11-14 RX ADMIN — HEPARIN SODIUM 5000 UNITS: 5000 INJECTION INTRAVENOUS; SUBCUTANEOUS at 21:14

## 2024-11-14 RX ADMIN — Medication 10 ML: at 12:35

## 2024-11-14 RX ADMIN — HYDROMORPHONE HYDROCHLORIDE 0.25 MG: 1 INJECTION, SOLUTION INTRAMUSCULAR; INTRAVENOUS; SUBCUTANEOUS at 01:14

## 2024-11-14 RX ADMIN — THIAMINE HYDROCHLORIDE 200 MG: 100 INJECTION, SOLUTION INTRAMUSCULAR; INTRAVENOUS at 05:37

## 2024-11-14 RX ADMIN — OXYCODONE 5 MG: 5 TABLET ORAL at 03:10

## 2024-11-14 RX ADMIN — OXYCODONE 5 MG: 5 TABLET ORAL at 14:17

## 2024-11-14 RX ADMIN — HEPARIN SODIUM 5000 UNITS: 5000 INJECTION INTRAVENOUS; SUBCUTANEOUS at 08:58

## 2024-11-14 RX ADMIN — OXYCODONE 5 MG: 5 TABLET ORAL at 21:14

## 2024-11-14 RX ADMIN — Medication 2 G: at 10:25

## 2024-11-14 RX ADMIN — POLYETHYLENE GLYCOL (3350) 17 G: 17 POWDER, FOR SOLUTION ORAL at 10:25

## 2024-11-14 RX ADMIN — HYDROMORPHONE HYDROCHLORIDE 0.25 MG: 1 INJECTION, SOLUTION INTRAMUSCULAR; INTRAVENOUS; SUBCUTANEOUS at 17:22

## 2024-11-14 NOTE — NURSING NOTE
Transitional Care Note    Enrolled in [facility] Transitional Care Note    Enrolled in [facility] Transitional Care Services under theTCM Model to be followed for 6 weeks post discharge.  BTC will assist with support and education at time of transition home from hospital.  Hospital  will follow throughout the stay at [facility].  Home  will visit within 48 hours of discharge and follow with home vitals and telephone contact for 6 weeks.      Patient admitted to [facility] via Emergency Department. Admitted for further treatment of acute pancreatitis.    Explained transition to home program and Patient is agreeable to home visits.    Home  will be [facility]

## 2024-11-14 NOTE — PLAN OF CARE
Goal Outcome Evaluation:  Progress: no change   Pt resting in bed, watching television. Pt has tolerated all interventions. Pt has had complaints of abd pain. Pain medication administered as ordered. No acute distress noted. Call light within reach. Plan of care ongoing.

## 2024-11-14 NOTE — PLAN OF CARE
Goal Outcome Evaluation: Patient has been pleasant this shift, currently resting in bed on RA, saturations maintaining above 90%. No acute s/s of distress at this time. VSS. Will continue plan of care.

## 2024-11-14 NOTE — PROGRESS NOTES
[facility]     Patient Identification:  Name:  Jag Bueno  Age:  59 y.o.  Sex:  male  :  1965  MRN:  [id number]  Visit Number:  [id number]  Primary Care Provider:  Lorin Deras MD    Length of stay:  2        Subjective: Seen and evaluated with the nursing staff.  He reported decrease in his abdominal pain.  He is tolerating clear liquid diet.  Advance his diet to soft diet because he was requesting for discharge.  However he did not tolerate it.  ----------------------------------------------------------------------------------------------------------------------  Current Hospital Meds:  Brexpiprazole, 1 mg, Oral, Daily  buPROPion XL, 300 mg, Oral, Daily  cholestyramine, 1 packet, Oral, Q12H  [Held by provider] Darunavir-Cobicistat, 1 tablet, Oral, Daily  [Held by provider] dolutegravir, 50 mg, Oral, Daily  [Held by provider] Doravirine, 100 mg, Oral, Daily  finasteride, 5 mg, Oral, Daily  folic acid 1 mg in sodium chloride 0.9 % 50 mL IVPB, 1 mg, Intravenous, Daily  [Held by provider] Fostemsavir Tromethamine ER, 600 mg, Oral, BID  heparin (porcine), 5,000 Units, Subcutaneous, Q12H  sodium chloride, 10 mL, Intravenous, Q12H  sodium chloride, 2 g, Oral, BID With Meals  Testosterone, 2 Application, Transdermal, Daily  Testosterone Cypionate, 200 mg, Intramuscular, Q14 Days  thiamine (B-1) IV, 200 mg, Intravenous, Q8H   Followed by  [START ON 2024] thiamine, 100 mg, Oral, Daily  triamcinolone, 1 Application, Topical, BID         ----------------------------------------------------------------------------------------------------------------------  Vital Signs:  Temp:  [97.9 °F (36.6 °C)-99 °F (37.2 °C)] 98.2 °F (36.8 °C)  Heart Rate:  [] 91  Resp:  [18] 18  BP: (112-154)/(72-95) 154/95      24  0351 24  0500 24  0500   Weight: 88.7 kg (195 lb 8 oz) 89.6 kg (197 lb 8 oz) 89.9 kg (198 lb 1.6 oz)     Body mass index is 26.14  kg/m².    Intake/Output Summary (Last 24 hours) at 11/14/2024 1407  Last data filed at 11/14/2024 1300  Gross per 24 hour   Intake 620 ml   Output --   Net 620 ml     Diet: Liquid; Full Liquid; Fluid Consistency: Thin (IDDSI 0)  ----------------------------------------------------------------------------------------------------------------------  Physical exam:  Constitutional: Middle aged man, sitting on the recliner by the bedside.  He seems comfortable.  HENT:  Head:  Normocephalic and atraumatic.  Mouth:  Moist mucous membranes.    Eyes:  Conjunctivae and EOM are normal.  Pupils are equal, round, and reactive to light.  No scleral icterus.    Neck:  Neck supple.  No JVD present.    Cardiovascular:  Normal rate, regular rhythm and normal heart sounds with no murmur.  Pulmonary/Chest:  No respiratory distress, no wheezes, no crackles, with normal breath sounds and good air movement.  Abdominal: Full with very mild epigastric pain, no guarding and no rebound.  Bowel sounds are normal.    Musculoskeletal:  No edema, no tenderness, and no deformity.  No red or swollen joints anywhere.    Neurological:  Alert and oriented to person, place, and time.  No cranial nerve deficit.  No tongue deviation.  No facial droop.  No slurred speech.   Skin:  Skin is warm and dry. No rash noted. No pallor.   Peripheral vascular:  Strong pulses in all 4 extremities with no clubbing, no cyanosis, no edema.  Genitourinary: Deferred  ----------------------------------------------------------------------------------------------------------------------  Tele:    ----------------------------------------------------------------------------------------------------------------------      Results from last 7 days   Lab Units 11/14/24  0106 11/12/24  0429 11/11/24  0459   LACTATE mmol/L  --   --  1.8   WBC 10*3/mm3 15.62* 13.87* 14.55*   HEMOGLOBIN g/dL 15.5 16.3 17.5   HEMATOCRIT % 47.6 50.8 54.9*   MCV fL 100.0* 101.8* 99.8*   MCHC g/dL 32.6  "32.1 31.9   PLATELETS 10*3/mm3 178 187 232         Results from last 7 days   Lab Units 11/14/24  0106 11/12/24  0429 11/11/24 2114   SODIUM mmol/L 129* 136 142   POTASSIUM mmol/L 4.1 4.8 4.0   CHLORIDE mmol/L 95* 102 104   CO2 mmol/L 22.4 24.3 25.5   BUN mg/dL 8 16 15   CREATININE mg/dL 0.87 1.09 1.13   CALCIUM mg/dL 9.6 9.9 10.7*   GLUCOSE mg/dL 114* 125* 120*   ALBUMIN g/dL 3.7 4.3 4.8   BILIRUBIN mg/dL 0.8 0.6 0.4   ALK PHOS U/L 62 61 64   AST (SGOT) U/L 16 18 21   ALT (SGPT) U/L 11 20 23   Estimated Creatinine Clearance: 116.3 mL/min (by C-G formula based on SCr of 0.87 mg/dL).    No results found for: \"AMMONIA\"  Results from last 7 days   Lab Units 11/11/24 2114   TRIGLYCERIDES mg/dL 338*     No results found for: \"BLOODCX\"  No results found for: \"URINECX\"  No results found for: \"WOUNDCX\"  No results found for: \"STOOLCX\"    I have personally looked at the labs and they are summarized above.  ----------------------------------------------------------------------------------------------------------------------  Imaging Results (Last 24 Hours)       ** No results found for the last 24 hours. **          ----------------------------------------------------------------------------------------------------------------------  Assessment and Plan:  # Acute pancreatitis present on admission  Exact etiology not known.  Patient reports minimal alcohol use.  He has multiple medications that may induce acute pancreatitis.  He does not have any gallbladder.  Patient pain seems to be improving.  Diet was advanced to soft diet but he did not tolerated.  Will continue on full liquid diet and advance as tolerated  Continue IV pain medication  Discontinue IV fluid    # HIV.  Will resume home medications as soon as patient is able to tolerate p.o.    # Macrocytosis.    # Leukocytosis  Likely secondary to inflammation due to pancreatitis.  Will continue to trend    # Hypertriglyceridemia  Patient triglyceride level is about 300.  " This level may not be enough to induce acute pancreatitis.  Managed with diet and lifestyle modification.  Will need to follow-up with PCP outpatient for further management    # Hyponatremia.  Sodium tablets 2 g twice daily    Chronic medical problems:  Anxiety  Sleep apnea      Prophylaxis:  DVT-heparin    Disposition:  Anticipate discharge in the next 1 to 2 days if pain is adequately controlled and patient tolerates p.o.  I discussed the plan of care with the patient and nursing staff.      Aaron Miller MD  11/14/24  14:07 EST    Dragon disclaimer:  Part of this note may be an electronic transcription/translation of spoken language to printed text using the Dragon Dictation System.

## 2024-11-15 VITALS
TEMPERATURE: 98.7 F | OXYGEN SATURATION: 97 % | WEIGHT: 196.8 LBS | DIASTOLIC BLOOD PRESSURE: 89 MMHG | RESPIRATION RATE: 18 BRPM | SYSTOLIC BLOOD PRESSURE: 138 MMHG | HEART RATE: 102 BPM | HEIGHT: 73 IN | BODY MASS INDEX: 26.08 KG/M2

## 2024-11-15 LAB
ALBUMIN SERPL-MCNC: 3.8 G/DL (ref 3.5–5.2)
ALBUMIN/GLOB SERPL: 1.3 G/DL
ALP SERPL-CCNC: 68 U/L (ref 39–117)
ALT SERPL W P-5'-P-CCNC: 11 U/L (ref 1–41)
ANION GAP SERPL CALCULATED.3IONS-SCNC: 10.3 MMOL/L (ref 5–15)
AST SERPL-CCNC: 12 U/L (ref 1–40)
BASOPHILS # BLD AUTO: 0.02 10*3/MM3 (ref 0–0.2)
BASOPHILS NFR BLD AUTO: 0.1 % (ref 0–1.5)
BILIRUB SERPL-MCNC: 0.7 MG/DL (ref 0–1.2)
BUN SERPL-MCNC: 8 MG/DL (ref 6–20)
BUN/CREAT SERPL: 8.3 (ref 7–25)
CALCIUM SPEC-SCNC: 9.7 MG/DL (ref 8.6–10.5)
CHLORIDE SERPL-SCNC: 95 MMOL/L (ref 98–107)
CO2 SERPL-SCNC: 24.7 MMOL/L (ref 22–29)
CREAT SERPL-MCNC: 0.96 MG/DL (ref 0.76–1.27)
DEPRECATED RDW RBC AUTO: 55.2 FL (ref 37–54)
EGFRCR SERPLBLD CKD-EPI 2021: 91.1 ML/MIN/1.73
EOSINOPHIL # BLD AUTO: 0.06 10*3/MM3 (ref 0–0.4)
EOSINOPHIL NFR BLD AUTO: 0.4 % (ref 0.3–6.2)
ERYTHROCYTE [DISTWIDTH] IN BLOOD BY AUTOMATED COUNT: 14.9 % (ref 12.3–15.4)
GLOBULIN UR ELPH-MCNC: 2.9 GM/DL
GLUCOSE SERPL-MCNC: 114 MG/DL (ref 65–99)
HCT VFR BLD AUTO: 46.8 % (ref 37.5–51)
HGB BLD-MCNC: 15.3 G/DL (ref 13–17.7)
IMM GRANULOCYTES # BLD AUTO: 0.1 10*3/MM3 (ref 0–0.05)
IMM GRANULOCYTES NFR BLD AUTO: 0.7 % (ref 0–0.5)
LIPASE SERPL-CCNC: 39 U/L (ref 13–60)
LYMPHOCYTES # BLD AUTO: 2.25 10*3/MM3 (ref 0.7–3.1)
LYMPHOCYTES NFR BLD AUTO: 16.1 % (ref 19.6–45.3)
MCH RBC QN AUTO: 32.6 PG (ref 26.6–33)
MCHC RBC AUTO-ENTMCNC: 32.7 G/DL (ref 31.5–35.7)
MCV RBC AUTO: 99.6 FL (ref 79–97)
MONOCYTES # BLD AUTO: 1.21 10*3/MM3 (ref 0.1–0.9)
MONOCYTES NFR BLD AUTO: 8.6 % (ref 5–12)
NEUTROPHILS NFR BLD AUTO: 10.36 10*3/MM3 (ref 1.7–7)
NEUTROPHILS NFR BLD AUTO: 74.1 % (ref 42.7–76)
NRBC BLD AUTO-RTO: 0 /100 WBC (ref 0–0.2)
PLATELET # BLD AUTO: 191 10*3/MM3 (ref 140–450)
PMV BLD AUTO: 10.7 FL (ref 6–12)
POTASSIUM SERPL-SCNC: 4.2 MMOL/L (ref 3.5–5.2)
PROT SERPL-MCNC: 6.7 G/DL (ref 6–8.5)
RBC # BLD AUTO: 4.7 10*6/MM3 (ref 4.14–5.8)
SODIUM SERPL-SCNC: 130 MMOL/L (ref 136–145)
WBC NRBC COR # BLD AUTO: 14 10*3/MM3 (ref 3.4–10.8)

## 2024-11-15 PROCEDURE — 25010000002 THIAMINE HCL 200 MG/2ML SOLUTION: Performed by: STUDENT IN AN ORGANIZED HEALTH CARE EDUCATION/TRAINING PROGRAM

## 2024-11-15 PROCEDURE — 83690 ASSAY OF LIPASE: CPT | Performed by: INTERNAL MEDICINE

## 2024-11-15 PROCEDURE — 85025 COMPLETE CBC W/AUTO DIFF WBC: CPT | Performed by: INTERNAL MEDICINE

## 2024-11-15 PROCEDURE — 25010000002 HYDROMORPHONE PER 4 MG

## 2024-11-15 PROCEDURE — 25010000002 HEPARIN (PORCINE) PER 1000 UNITS: Performed by: INTERNAL MEDICINE

## 2024-11-15 PROCEDURE — 80053 COMPREHEN METABOLIC PANEL: CPT | Performed by: INTERNAL MEDICINE

## 2024-11-15 PROCEDURE — 99239 HOSP IP/OBS DSCHRG MGMT >30: CPT | Performed by: INTERNAL MEDICINE

## 2024-11-15 RX ORDER — ONDANSETRON 4 MG/1
4 TABLET, FILM COATED ORAL EVERY 8 HOURS PRN
Qty: 20 TABLET | Refills: 0 | Status: SHIPPED | OUTPATIENT
Start: 2024-11-15 | End: 2024-11-22

## 2024-11-15 RX ORDER — AMOXICILLIN 250 MG
2 CAPSULE ORAL 2 TIMES DAILY PRN
Qty: 20 TABLET | Refills: 0 | Status: SHIPPED | OUTPATIENT
Start: 2024-11-15 | End: 2024-11-25

## 2024-11-15 RX ORDER — OXYCODONE HYDROCHLORIDE 5 MG/1
5 TABLET ORAL EVERY 6 HOURS PRN
Qty: 20 TABLET | Refills: 0 | Status: SHIPPED | OUTPATIENT
Start: 2024-11-15 | End: 2024-11-20

## 2024-11-15 RX ADMIN — Medication 10 ML: at 08:28

## 2024-11-15 RX ADMIN — HEPARIN SODIUM 5000 UNITS: 5000 INJECTION INTRAVENOUS; SUBCUTANEOUS at 08:28

## 2024-11-15 RX ADMIN — BREXPIPRAZOLE 1 MG: 1 TABLET ORAL at 08:27

## 2024-11-15 RX ADMIN — THIAMINE HYDROCHLORIDE 200 MG: 100 INJECTION, SOLUTION INTRAMUSCULAR; INTRAVENOUS at 06:19

## 2024-11-15 RX ADMIN — HYDROMORPHONE HYDROCHLORIDE 0.25 MG: 1 INJECTION, SOLUTION INTRAMUSCULAR; INTRAVENOUS; SUBCUTANEOUS at 00:09

## 2024-11-15 RX ADMIN — LACTULOSE 20 G: 20 SOLUTION ORAL at 08:28

## 2024-11-15 RX ADMIN — OXYCODONE 5 MG: 5 TABLET ORAL at 06:22

## 2024-11-15 RX ADMIN — FINASTERIDE 5 MG: 5 TABLET, FILM COATED ORAL at 08:27

## 2024-11-15 RX ADMIN — Medication 2 G: at 08:28

## 2024-11-15 RX ADMIN — BUPROPION HYDROCHLORIDE 300 MG: 150 TABLET, EXTENDED RELEASE ORAL at 08:27

## 2024-11-15 NOTE — DISCHARGE SUMMARY
"    [facility] HOSPITALIST MEDICINE DISCHARGE SUMMARY    Patient Identification:  Name:  Jag Bueno  Age:  59 y.o.  Sex:  male  :  1965  MRN:  [id number]  Visit Number:  [id number]    Date of Admission: 2024  Date of Discharge:  11/15/2024     PCP: Lorin Deras MD    DISCHARGE DIAGNOSIS  Acute pancreatitis.  Macrocytosis  Leukocytosis  Hypertriglyceridemia  Hyponatremia  Anxiety  Obstructive sleep apnea  HIV    CONSULTS   None    PROCEDURES PERFORMED  None    HOSPITAL COURSE  Patient is a 59 y.o. male presented to [facility] for evaluation of abdominal pain.  His medical history include HIV,, anxiety, and sleep apnea.  He reports that around noon shortly after eating lunch a day prior to presentation, he began having severe epigastric pain that worsened throughout the day.  He reports nausea and vomiting.  He states that at first he thought it was \"phantom gallbladder attacks\" that he has had a couple times since having his gallbladder removed.  He states the pain was since when he moves or leans forward.  He does report improvement with pain medications given in ED.  Pain improves with lying back.  Denies any past history of pancreatitis.  Denies any recent medication changes.  He drinks alcohol occasionally, and admits he drank 2 white claws a few days earlier.Upon arrival to the ED, vitals were temperature 98.5, HR 66, RR 18, /78.  Labs significant for lipase greater than 3000, WBC 14.55.  Ethanol level negative.  CT Abdo/pelvis significant for acute pancreatitis.  No bowel obstruction, perforation.  No acute appendicitis colitis or diverticulitis.  He was admitted, made n.p.o., placed on IV fluid and pain medication.  His pain gradually improved and he was placed on clear liquid diet.  He tolerated clear liquid diet and was advanced to soft diet which she did not tolerate.  His pain has improved significantly and is currently on full liquid diet.  His " lipase is back to normal.  He is requesting for discharge.  At this time he will be discharged.  He has been told to remain on full liquid diet and to gradually advance the diet.  He also has been instructed to return to ED if his pain gets worse and cannot tolerate p.o.    VITAL SIGNS:      11/13/24  0500 11/14/24  0500 11/15/24  0500   Weight: 89.6 kg (197 lb 8 oz) 89.9 kg (198 lb 1.6 oz) 89.3 kg (196 lb 12.8 oz)     Body mass index is 25.96 kg/m².    PHYSICAL EXAM:  Constitutional: Middle aged man, he seems comfortable   HENT:  Head:  Normocephalic and atraumatic.  Mouth:  Moist mucous membranes.    Eyes:  Conjunctivae and EOM are normal.  Pupils are equal, round, and reactive to light.  No scleral icterus.    Neck:  Neck supple.  No JVD present.    Cardiovascular:  Normal rate, regular rhythm and normal heart sounds with no murmur.  Pulmonary/Chest:  No respiratory distress, no wheezes, no crackles, with normal breath sounds and good air movement.  Abdominal: Full with minimal epigastric tenderness, no guarding and no rebound.  Bowel sounds are normal.    Musculoskeletal:  No edema, no tenderness, and no deformity.  No red or swollen joints anywhere.    Neurological:  Alert and oriented to person, place, and time.  No cranial nerve deficit.  No tongue deviation.  No facial droop.  No slurred speech.   Skin:  Skin is warm and dry. No rash noted. No pallor.   Peripheral vascular:  Strong pulses in all 4 extremities with no clubbing, no cyanosis, no edema.  Genitourinary: Deferred    Lab Results (last 48 hours)       Procedure Component Value Units Date/Time    Comprehensive Metabolic Panel [[id number]]  (Abnormal) Collected: 11/15/24 0055    Specimen: Blood Updated: 11/15/24 0214     Glucose 114 mg/dL      BUN 8 mg/dL      Creatinine 0.96 mg/dL      Sodium 130 mmol/L      Potassium 4.2 mmol/L      Chloride 95 mmol/L      CO2 24.7 mmol/L      Calcium 9.7 mg/dL      Total Protein 6.7 g/dL      Albumin 3.8 g/dL       ALT (SGPT) 11 U/L      AST (SGOT) 12 U/L      Alkaline Phosphatase 68 U/L      Total Bilirubin 0.7 mg/dL      Globulin 2.9 gm/dL      A/G Ratio 1.3 g/dL      BUN/Creatinine Ratio 8.3     Anion Gap 10.3 mmol/L      eGFR 91.1 mL/min/1.73     Narrative:      GFR Normal >60  Chronic Kidney Disease <60  Kidney Failure <15      Lipase [[id number]]  (Normal) Collected: 11/15/24 0055    Specimen: Blood Updated: 11/15/24 0214     Lipase 39 U/L     CBC & Differential [[id number]]  (Abnormal) Collected: 11/15/24 0055    Specimen: Blood Updated: 11/15/24 0136    Narrative:      The following orders were created for panel order CBC & Differential.  Procedure                               Abnormality         Status                     ---------                               -----------         ------                     CBC Auto Differential[[id number]]        Abnormal            Final result                 Please view results for these tests on the individual orders.    CBC Auto Differential [[id number]]  (Abnormal) Collected: 11/15/24 0055    Specimen: Blood Updated: 11/15/24 0136     WBC 14.00 10*3/mm3      RBC 4.70 10*6/mm3      Hemoglobin 15.3 g/dL      Hematocrit 46.8 %      MCV 99.6 fL      MCH 32.6 pg      MCHC 32.7 g/dL      RDW 14.9 %      RDW-SD 55.2 fl      MPV 10.7 fL      Platelets 191 10*3/mm3      Neutrophil % 74.1 %      Lymphocyte % 16.1 %      Monocyte % 8.6 %      Eosinophil % 0.4 %      Basophil % 0.1 %      Immature Grans % 0.7 %      Neutrophils, Absolute 10.36 10*3/mm3      Lymphocytes, Absolute 2.25 10*3/mm3      Monocytes, Absolute 1.21 10*3/mm3      Eosinophils, Absolute 0.06 10*3/mm3      Basophils, Absolute 0.02 10*3/mm3      Immature Grans, Absolute 0.10 10*3/mm3      nRBC 0.0 /100 WBC     Procalcitonin [[id number]]  (Normal) Collected: 11/14/24 0732    Specimen: Blood Updated: 11/14/24 0840     Procalcitonin 0.13 ng/mL     Narrative:      As a Marker for Sepsis (Non-Neonates):    1. <0.5 ng/mL  "represents a low risk of severe sepsis and/or septic shock.  2. >2 ng/mL represents a high risk of severe sepsis and/or septic shock.    As a Marker for Lower Respiratory Tract Infections that require antibiotic therapy:    PCT on Admission    Antibiotic Therapy       6-12 Hrs later    >0.5                Strongly Recommended  >0.25 - <0.5        Recommended   0.1 - 0.25          Discouraged              Remeasure/reassess PCT  <0.1                Strongly Discouraged     Remeasure/reassess PCT    As 28 day mortality risk marker: \"Change in Procalcitonin Result\" (>80% or <=80%) if Day 0 (or Day 1) and Day 4 values are available. Refer to http://www.[email or web address] [place]-pct-calculator.com    Change in PCT <=80%  A decrease of PCT levels below or equal to 80% defines a positive change in PCT test result representing a higher risk for 28-day all-cause mortality of patients diagnosed with severe sepsis for septic shock.    Change in PCT >80%  A decrease of PCT levels of more than 80% defines a negative change in PCT result representing a lower risk for 28-day all-cause mortality of patients diagnosed with severe sepsis or septic shock.       POC Glucose Once [[id number]]  (Abnormal) Collected: 11/14/24 0751    Specimen: Blood Updated: 11/14/24 0757     Glucose 135 mg/dL     Comprehensive Metabolic Panel [[id number]]  (Abnormal) Collected: 11/14/24 0106    Specimen: Blood Updated: 11/14/24 0259     Glucose 114 mg/dL      BUN 8 mg/dL      Creatinine 0.87 mg/dL      Sodium 129 mmol/L      Potassium 4.1 mmol/L      Comment: Slight hemolysis detected by analyzer. Result may be falsely elevated.        Chloride 95 mmol/L      CO2 22.4 mmol/L      Calcium 9.6 mg/dL      Total Protein 6.6 g/dL      Albumin 3.7 g/dL      ALT (SGPT) 11 U/L      AST (SGOT) 16 U/L      Alkaline Phosphatase 62 U/L      Total Bilirubin 0.8 mg/dL      Globulin 2.9 gm/dL      A/G Ratio 1.3 g/dL      BUN/Creatinine Ratio 9.2     Anion Gap 11.6 mmol/L      eGFR 99.4 " mL/min/1.73     Narrative:      GFR Normal >60  Chronic Kidney Disease <60  Kidney Failure <15      CBC & Differential [[id number]]  (Abnormal) Collected: 11/14/24 0106    Specimen: Blood Updated: 11/14/24 0211    Narrative:      The following orders were created for panel order CBC & Differential.  Procedure                               Abnormality         Status                     ---------                               -----------         ------                     CBC Auto Differential[[id number]]        Abnormal            Final result                 Please view results for these tests on the individual orders.    CBC Auto Differential [[id number]]  (Abnormal) Collected: 11/14/24 0106    Specimen: Blood Updated: 11/14/24 0211     WBC 15.62 10*3/mm3      RBC 4.76 10*6/mm3      Hemoglobin 15.5 g/dL      Hematocrit 47.6 %      .0 fL      MCH 32.6 pg      MCHC 32.6 g/dL      RDW 15.3 %      RDW-SD 57.1 fl      MPV 10.8 fL      Platelets 178 10*3/mm3      Neutrophil % 77.1 %      Lymphocyte % 14.3 %      Monocyte % 7.7 %      Eosinophil % 0.3 %      Basophil % 0.1 %      Immature Grans % 0.5 %      Neutrophils, Absolute 12.03 10*3/mm3      Lymphocytes, Absolute 2.24 10*3/mm3      Monocytes, Absolute 1.20 10*3/mm3      Eosinophils, Absolute 0.05 10*3/mm3      Basophils, Absolute 0.02 10*3/mm3      Immature Grans, Absolute 0.08 10*3/mm3      nRBC 0.0 /100 WBC           Imaging Results (Last 7 Days)       Procedure Component Value Units Date/Time    CT Abdomen Pelvis With Contrast [[id number]] Collected: 11/12/24 0043     Updated: 11/12/24 0052    Narrative:      EXAMINATION: CT abdomen and pelvis with contrast     CLINICAL HISTORY: Abdominal pain     COMPARISON: None.     TECHNIQUE: Contiguous 5 mm thick slices were obtained through the  abdomen and pelvis after the administration of Isovue-300 intravenous  contrast. Coronal and sagittal reformats were performed.     FINDINGS:     ABDOMEN:  Mild scarring or  atelectasis is noted posteriorly within the lower  lobes. The gallbladder is surgically absent. There is infiltration of  the fat adjacent to the pancreas. The findings strongly suggest acute  pancreatitis. A focus of calcification within the head of the pancreas  is likely the sequela of prior inflammation. There is mild wall  thickening within the duodenum, likely reactive in nature. No adjacent  focal fluid collection is identified to suggest a pseudocyst or abscess.  The adrenal glands and spleen appear unremarkable. No hydronephrosis  either kidney. No definite ureteral calculus.     PELVIS:  There is no bowel obstruction or perforation. No free air. No  significant free or loculated collection is appreciated. No adenopathy,  free or loculated collection is appreciated within the pelvis.       Impression:      1. Haziness of the fat adjacent to the pancreas as above. The findings  strongly suggest acute pancreatitis.  2. No bowel obstruction or perforation.  3. No acute appendicitis, colitis or diverticulitis.           This report was finalized on 11/12/2024 12:50 AM by Jose Ravi MD.              DISCHARGE DISPOSITION   Condition of patient at time of discharge is stable  He will be discharged home in the care of the family  I reviewed his PDMP prior to ordering oxycodone    DISCHARGE MEDICATIONS:     Discharge Medications        New Medications        Instructions Start Date   ondansetron 4 MG tablet  Commonly known as: Zofran   4 mg, Oral, Every 8 Hours PRN      oxyCODONE 5 MG immediate release tablet  Commonly known as: ROXICODONE   5 mg, Oral, Every 6 Hours PRN      sennosides-docusate 8.6-50 MG per tablet  Commonly known as: PERICOLACE   2 tablets, Oral, 2 Times Daily PRN             Continue These Medications        Instructions Start Date   buPROPion  MG 24 hr tablet  Commonly known as: WELLBUTRIN XL   300 mg, Oral, Daily      colesevelam 3.75 g pack pack  Commonly known as: WELCHOL   3.75 g,  Oral, Daily      Darunavir-Cobicistat 800-150 MG per tablet  Commonly known as: PREZCOBIX   1 tablet, Oral, Daily      dolutegravir 50 MG tablet  Commonly known as: TIVICAY   50 mg, Oral, Daily      dutasteride 0.5 MG capsule  Commonly known as: AVODART   0.5 mg, Oral, Daily      famotidine 20 MG tablet  Commonly known as: PEPCID   20 mg, Oral, Nightly PRN      Fostemsavir Tromethamine  MG tablet sustained-release 12 hour   600 mg, Oral, 2 Times Daily      LORazepam 1 MG tablet  Commonly known as: ATIVAN   1 mg, Oral, Every 8 Hours PRN      Pifeltro 100 MG tablet  Generic drug: Doravirine   100 mg, Oral, Daily      Rexulti 1 MG tablet  Generic drug: Brexpiprazole   1 mg, Oral, Daily      Testosterone 20.25 MG/1.25GM (1.62%) gel   1 Application, Transdermal, Daily, Prior to [facility] Admission, Patient was on: 2 pumps daily       Testosterone Cypionate 200 MG/ML solution   200 mg, Injection, Every 14 Days      triamcinolone 0.1 % cream  Commonly known as: KENALOG   1 Application, Topical, 2 Times Daily, Prior to [facility] Admission, Patient was on: apply to rash on chest             Stop These Medications      diclofenac 75 MG EC tablet  Commonly known as: VOLTAREN              Diet Instructions       Advance Diet As Tolerated -Target Diet: Continue full liquid diet and slowly advance to soft food      Target Diet: Continue full liquid diet and slowly advance to soft food            Activity Instructions       Activity as Tolerated            Additional Instructions for the Follow-ups that You Need to Schedule       Call MD With Problems / Concerns   As directed      Instructions:  Pain or unable to tolerate per oral    Order Comments: Instructions:  Pain or unable to tolerate per oral         Discharge Follow-up with PCP   As directed       Currently Documented PCP:    Lorin Deras MD    PCP Phone Number:    [phone]     Follow Up Details: Post admission f/up in 1 week               Follow-up Information        Lorin Deras MD .    Specialty: Geriatric Medicine  Why: Post admission f/up in 1 week  Contact information:  [address] [place] [address] [phone]                             TEST  RESULTS PENDING AT DISCHARGE       Aaron Miller MD  11/15/24  09:58 EST    Please note that this discharge summary required more than 30 minutes to complete.    Please send a copy of this dictation to the following providers:  Lorin Deras MD Dragon disclaimer:  Part of this note may be an electronic transcription/translation of spoken language to printed text using the Dragon Dictation System.

## 2024-11-15 NOTE — NURSING NOTE
Pt D/C'd per Dr. Arteaga. IV and Telemetry D/C'd. D/C education provided to pt at bedside with verbal understanding. Pt resting in bedside chair, engaged in cell phone. Pt has tolerated all interventions. No complaints/concerns. No acute distress noted. Call light within reach. Will provide care until pt off unit.

## 2024-11-15 NOTE — PAYOR COMM NOTE
"[facility]  NPI:[id number]    Utilization Review  Contact: Stephanie Robison RN  Phone: [phone]  Fax:[phone]    DISCHARGE NOTIFICATION     [id number]       Griselda Bueno (59 y.o. Male)       Date of Birth   1965    Social Security Number       Address   [address] [place] [address]    Home Phone   [phone]    MRN   [id number]       [employer]   [employer]    Marital Status   Single                            Admission Date   24    Admission Type   Emergency    Admitting Provider   Dorothy Adams DO    Attending Provider       Department, Room/Bed   [address], 3325/1P       Discharge Date   11/15/2024    Discharge Disposition   Home or Self Care    Discharge Destination   Home                              Attending Provider: (none)   Allergies: Codeine    Isolation: None   Infection: None   Code Status: CPR    Ht: 185.4 cm (73\")   Wt: 89.3 kg (196 lb 12.8 oz)    Admission Cmt: None   Principal Problem: Acute pancreatitis [K85.90]                   Active Insurance as of 2024       Primary Coverage       Payor Plan Insurance Group Employer/Plan Group    ANTHEM MEDICAID AN[place]M MEDICAID [id number]       Payor Plan Address Payor Plan Phone Number Payor Plan Fax Number Effective Dates    [address] [address] [phone]  2016 - None Entered    [facility] [id number]         Subscriber Name Subscriber Birth Date Member ID       GRISELDA BUENO 1965 [id number]                     Emergency Contacts        (Rel.) Home Phone Work Phone Mobile Phone    AshleyMirlandeLakshmi (Sister) [phone] -- [phone]                 Discharge Summary        Aaron Miller MD at 11/15/24 0958              [facility] HOSPITALIST MEDICINE DISCHARGE SUMMARY    Patient Identification:  Name:  Griselda Bueno  Age:  59 y.o.  Sex:  male  :  1965  MRN:  [id number]  Visit Number:  " "[id number]    Date of Admission: 11/11/2024  Date of Discharge:  11/15/2024     PCP: Lorin Deras MD    DISCHARGE DIAGNOSIS  Acute pancreatitis.  Macrocytosis  Leukocytosis  Hypertriglyceridemia  Hyponatremia  Anxiety  Obstructive sleep apnea  HIV    CONSULTS   None    PROCEDURES PERFORMED  None    HOSPITAL COURSE  Patient is a 59 y.o. male presented to [facility] for evaluation of abdominal pain.  His medical history include HIV,, anxiety, and sleep apnea.  He reports that around noon shortly after eating lunch a day prior to presentation, he began having severe epigastric pain that worsened throughout the day.  He reports nausea and vomiting.  He states that at first he thought it was \"phantom gallbladder attacks\" that he has had a couple times since having his gallbladder removed.  He states the pain was since when he moves or leans forward.  He does report improvement with pain medications given in ED.  Pain improves with lying back.  Denies any past history of pancreatitis.  Denies any recent medication changes.  He drinks alcohol occasionally, and admits he drank 2 white claws a few days earlier.Upon arrival to the ED, vitals were temperature 98.5, HR 66, RR 18, /78.  Labs significant for lipase greater than 3000, WBC 14.55.  Ethanol level negative.  CT Abdo/pelvis significant for acute pancreatitis.  No bowel obstruction, perforation.  No acute appendicitis colitis or diverticulitis.  He was admitted, made n.p.o., placed on IV fluid and pain medication.  His pain gradually improved and he was placed on clear liquid diet.  He tolerated clear liquid diet and was advanced to soft diet which she did not tolerate.  His pain has improved significantly and is currently on full liquid diet.  His lipase is back to normal.  He is requesting for discharge.  At this time he will be discharged.  He has been told to remain on full liquid diet and to gradually advance the diet.  He also has been " instructed to return to ED if his pain gets worse and cannot tolerate p.o.    VITAL SIGNS:      11/13/24  0500 11/14/24  0500 11/15/24  0500   Weight: 89.6 kg (197 lb 8 oz) 89.9 kg (198 lb 1.6 oz) 89.3 kg (196 lb 12.8 oz)     Body mass index is 25.96 kg/m².    PHYSICAL EXAM:  Constitutional: Middle aged man, he seems comfortable   HENT:  Head:  Normocephalic and atraumatic.  Mouth:  Moist mucous membranes.    Eyes:  Conjunctivae and EOM are normal.  Pupils are equal, round, and reactive to light.  No scleral icterus.    Neck:  Neck supple.  No JVD present.    Cardiovascular:  Normal rate, regular rhythm and normal heart sounds with no murmur.  Pulmonary/Chest:  No respiratory distress, no wheezes, no crackles, with normal breath sounds and good air movement.  Abdominal: Full with minimal epigastric tenderness, no guarding and no rebound.  Bowel sounds are normal.    Musculoskeletal:  No edema, no tenderness, and no deformity.  No red or swollen joints anywhere.    Neurological:  Alert and oriented to person, place, and time.  No cranial nerve deficit.  No tongue deviation.  No facial droop.  No slurred speech.   Skin:  Skin is warm and dry. No rash noted. No pallor.   Peripheral vascular:  Strong pulses in all 4 extremities with no clubbing, no cyanosis, no edema.  Genitourinary: Deferred    Lab Results (last 48 hours)       Procedure Component Value Units Date/Time    Comprehensive Metabolic Panel [[id number]]  (Abnormal) Collected: 11/15/24 0055    Specimen: Blood Updated: 11/15/24 0214     Glucose 114 mg/dL      BUN 8 mg/dL      Creatinine 0.96 mg/dL      Sodium 130 mmol/L      Potassium 4.2 mmol/L      Chloride 95 mmol/L      CO2 24.7 mmol/L      Calcium 9.7 mg/dL      Total Protein 6.7 g/dL      Albumin 3.8 g/dL      ALT (SGPT) 11 U/L      AST (SGOT) 12 U/L      Alkaline Phosphatase 68 U/L      Total Bilirubin 0.7 mg/dL      Globulin 2.9 gm/dL      A/G Ratio 1.3 g/dL      BUN/Creatinine Ratio 8.3     Anion Gap  10.3 mmol/L      eGFR 91.1 mL/min/1.73     Narrative:      GFR Normal >60  Chronic Kidney Disease <60  Kidney Failure <15      Lipase [[id number]]  (Normal) Collected: 11/15/24 0055    Specimen: Blood Updated: 11/15/24 0214     Lipase 39 U/L     CBC & Differential [[id number]]  (Abnormal) Collected: 11/15/24 0055    Specimen: Blood Updated: 11/15/24 0136    Narrative:      The following orders were created for panel order CBC & Differential.  Procedure                               Abnormality         Status                     ---------                               -----------         ------                     CBC Auto Differential[[id number]]        Abnormal            Final result                 Please view results for these tests on the individual orders.    CBC Auto Differential [[id number]]  (Abnormal) Collected: 11/15/24 0055    Specimen: Blood Updated: 11/15/24 0136     WBC 14.00 10*3/mm3      RBC 4.70 10*6/mm3      Hemoglobin 15.3 g/dL      Hematocrit 46.8 %      MCV 99.6 fL      MCH 32.6 pg      MCHC 32.7 g/dL      RDW 14.9 %      RDW-SD 55.2 fl      MPV 10.7 fL      Platelets 191 10*3/mm3      Neutrophil % 74.1 %      Lymphocyte % 16.1 %      Monocyte % 8.6 %      Eosinophil % 0.4 %      Basophil % 0.1 %      Immature Grans % 0.7 %      Neutrophils, Absolute 10.36 10*3/mm3      Lymphocytes, Absolute 2.25 10*3/mm3      Monocytes, Absolute 1.21 10*3/mm3      Eosinophils, Absolute 0.06 10*3/mm3      Basophils, Absolute 0.02 10*3/mm3      Immature Grans, Absolute 0.10 10*3/mm3      nRBC 0.0 /100 WBC     Procalcitonin [[id number]]  (Normal) Collected: 11/14/24 0732    Specimen: Blood Updated: 11/14/24 0840     Procalcitonin 0.13 ng/mL     Narrative:      As a Marker for Sepsis (Non-Neonates):    1. <0.5 ng/mL represents a low risk of severe sepsis and/or septic shock.  2. >2 ng/mL represents a high risk of severe sepsis and/or septic shock.    As a Marker for Lower Respiratory Tract Infections that require  "antibiotic therapy:    PCT on Admission    Antibiotic Therapy       6-12 Hrs later    >0.5                Strongly Recommended  >0.25 - <0.5        Recommended   0.1 - 0.25          Discouraged              Remeasure/reassess PCT  <0.1                Strongly Discouraged     Remeasure/reassess PCT    As 28 day mortality risk marker: \"Change in Procalcitonin Result\" (>80% or <=80%) if Day 0 (or Day 1) and Day 4 values are available. Refer to http://www.[facility]-pct-calculator.com    Change in PCT <=80%  A decrease of PCT levels below or equal to 80% defines a positive change in PCT test result representing a higher risk for 28-day all-cause mortality of patients diagnosed with severe sepsis for septic shock.    Change in PCT >80%  A decrease of PCT levels of more than 80% defines a negative change in PCT result representing a lower risk for 28-day all-cause mortality of patients diagnosed with severe sepsis or septic shock.       POC Glucose Once [[id number]]  (Abnormal) Collected: 11/14/24 0751    Specimen: Blood Updated: 11/14/24 0757     Glucose 135 mg/dL     Comprehensive Metabolic Panel [[id number]]  (Abnormal) Collected: 11/14/24 0106    Specimen: Blood Updated: 11/14/24 0259     Glucose 114 mg/dL      BUN 8 mg/dL      Creatinine 0.87 mg/dL      Sodium 129 mmol/L      Potassium 4.1 mmol/L      Comment: Slight hemolysis detected by analyzer. Result may be falsely elevated.        Chloride 95 mmol/L      CO2 22.4 mmol/L      Calcium 9.6 mg/dL      Total Protein 6.6 g/dL      Albumin 3.7 g/dL      ALT (SGPT) 11 U/L      AST (SGOT) 16 U/L      Alkaline Phosphatase 62 U/L      Total Bilirubin 0.8 mg/dL      Globulin 2.9 gm/dL      A/G Ratio 1.3 g/dL      BUN/Creatinine Ratio 9.2     Anion Gap 11.6 mmol/L      eGFR 99.4 mL/min/1.73     Narrative:      GFR Normal >60  Chronic Kidney Disease <60  Kidney Failure <15      CBC & Differential [[id number]]  (Abnormal) Collected: 11/14/24 0106    Specimen: Blood Updated: 11/14/24 " 0211    Narrative:      The following orders were created for panel order CBC & Differential.  Procedure                               Abnormality         Status                     ---------                               -----------         ------                     CBC Auto Differential[[id number]]        Abnormal            Final result                 Please view results for these tests on the individual orders.    CBC Auto Differential [[id number]]  (Abnormal) Collected: 11/14/24 0106    Specimen: Blood Updated: 11/14/24 0211     WBC 15.62 10*3/mm3      RBC 4.76 10*6/mm3      Hemoglobin 15.5 g/dL      Hematocrit 47.6 %      .0 fL      MCH 32.6 pg      MCHC 32.6 g/dL      RDW 15.3 %      RDW-SD 57.1 fl      MPV 10.8 fL      Platelets 178 10*3/mm3      Neutrophil % 77.1 %      Lymphocyte % 14.3 %      Monocyte % 7.7 %      Eosinophil % 0.3 %      Basophil % 0.1 %      Immature Grans % 0.5 %      Neutrophils, Absolute 12.03 10*3/mm3      Lymphocytes, Absolute 2.24 10*3/mm3      Monocytes, Absolute 1.20 10*3/mm3      Eosinophils, Absolute 0.05 10*3/mm3      Basophils, Absolute 0.02 10*3/mm3      Immature Grans, Absolute 0.08 10*3/mm3      nRBC 0.0 /100 WBC           Imaging Results (Last 7 Days)       Procedure Component Value Units Date/Time    CT Abdomen Pelvis With Contrast [[id number]] Collected: 11/12/24 0043     Updated: 11/12/24 0052    Narrative:      EXAMINATION: CT abdomen and pelvis with contrast     CLINICAL HISTORY: Abdominal pain     COMPARISON: None.     TECHNIQUE: Contiguous 5 mm thick slices were obtained through the  abdomen and pelvis after the administration of Isovue-300 intravenous  contrast. Coronal and sagittal reformats were performed.     FINDINGS:     ABDOMEN:  Mild scarring or atelectasis is noted posteriorly within the lower  lobes. The gallbladder is surgically absent. There is infiltration of  the fat adjacent to the pancreas. The findings strongly suggest acute  pancreatitis. A  focus of calcification within the head of the pancreas  is likely the sequela of prior inflammation. There is mild wall  thickening within the duodenum, likely reactive in nature. No adjacent  focal fluid collection is identified to suggest a pseudocyst or abscess.  The adrenal glands and spleen appear unremarkable. No hydronephrosis  either kidney. No definite ureteral calculus.     PELVIS:  There is no bowel obstruction or perforation. No free air. No  significant free or loculated collection is appreciated. No adenopathy,  free or loculated collection is appreciated within the pelvis.       Impression:      1. Haziness of the fat adjacent to the pancreas as above. The findings  strongly suggest acute pancreatitis.  2. No bowel obstruction or perforation.  3. No acute appendicitis, colitis or diverticulitis.           This report was finalized on 11/12/2024 12:50 AM by Jose Ravi MD.              DISCHARGE DISPOSITION   Condition of patient at time of discharge is stable  He will be discharged home in the care of the family  I reviewed his PDMP prior to ordering oxycodone    DISCHARGE MEDICATIONS:     Discharge Medications        New Medications        Instructions Start Date   ondansetron 4 MG tablet  Commonly known as: Zofran   4 mg, Oral, Every 8 Hours PRN      oxyCODONE 5 MG immediate release tablet  Commonly known as: ROXICODONE   5 mg, Oral, Every 6 Hours PRN      sennosides-docusate 8.6-50 MG per tablet  Commonly known as: PERICOLACE   2 tablets, Oral, 2 Times Daily PRN             Continue These Medications        Instructions Start Date   buPROPion  MG 24 hr tablet  Commonly known as: WELLBUTRIN XL   300 mg, Oral, Daily      colesevelam 3.75 g pack pack  Commonly known as: WELCHOL   3.75 g, Oral, Daily      Darunavir-Cobicistat 800-150 MG per tablet  Commonly known as: PREZCOBIX   1 tablet, Oral, Daily      dolutegravir 50 MG tablet  Commonly known as: TIVICAY   50 mg, Oral, Daily       dutasteride 0.5 MG capsule  Commonly known as: AVODART   0.5 mg, Oral, Daily      famotidine 20 MG tablet  Commonly known as: PEPCID   20 mg, Oral, Nightly PRN      Fostemsavir Tromethamine  MG tablet sustained-release 12 hour   600 mg, Oral, 2 Times Daily      LORazepam 1 MG tablet  Commonly known as: ATIVAN   1 mg, Oral, Every 8 Hours PRN      Pifeltro 100 MG tablet  Generic drug: Doravirine   100 mg, Oral, Daily      Rexulti 1 MG tablet  Generic drug: Brexpiprazole   1 mg, Oral, Daily      Testosterone 20.25 MG/1.25GM (1.62%) gel   1 Application, Transdermal, Daily, Prior to [facility] Admission, Patient was on: 2 pumps daily       Testosterone Cypionate 200 MG/ML solution   200 mg, Injection, Every 14 Days      triamcinolone 0.1 % cream  Commonly known as: KENALOG   1 Application, Topical, 2 Times Daily, Prior to [facility] Admission, Patient was on: apply to rash on chest             Stop These Medications      diclofenac 75 MG EC tablet  Commonly known as: VOLTAREN              Diet Instructions       Advance Diet As Tolerated -Target Diet: Continue full liquid diet and slowly advance to soft food      Target Diet: Continue full liquid diet and slowly advance to soft food            Activity Instructions       Activity as Tolerated            Additional Instructions for the Follow-ups that You Need to Schedule       Call MD With Problems / Concerns   As directed      Instructions:  Pain or unable to tolerate per oral    Order Comments: Instructions:  Pain or unable to tolerate per oral         Discharge Follow-up with PCP   As directed       Currently Documented PCP:    Lorin Deras MD    PCP Phone Number:    [phone]     Follow Up Details: Post admission f/up in 1 week               Follow-up Information       Lorin Deras MD .    Specialty: Geriatric Medicine  Why: Post admission f/up in 1 week  Contact information:  [address] [place] [address]  [phone]                              TEST  RESULTS PENDING AT DISCHARGE       Aaron Miller MD  11/15/24  09:58 EST    Please note that this discharge summary required more than 30 minutes to complete.    Please send a copy of this dictation to the following providers:  Lorin Deras MD Dragon disclaimer:  Part of this note may be an electronic transcription/translation of spoken language to printed text using the Dragon Dictation System.                      Electronically signed by Aaron Miller MD at 11/15/24 1009

## 2024-11-16 ENCOUNTER — READMISSION MANAGEMENT (OUTPATIENT)
Dept: CALL CENTER | Facility: HOSPITAL | Age: 59
End: 2024-11-16
Payer: MEDICAID

## 2024-11-16 NOTE — OUTREACH NOTE
Prep Survey      Flowsheet Row Responses   [facility] facility patient discharged from? [facility]   Is LACE score < 7 ? No   Eligibility Readm Mgmt   Discharge diagnosis Acute pancreatitis   Does the patient have one of the following disease processes/diagnoses(primary or secondary)? Other   Prep survey completed? Yes            Taty HUI - Registered Nurse

## 2024-11-18 ENCOUNTER — READMISSION MANAGEMENT (OUTPATIENT)
Dept: CALL CENTER | Facility: HOSPITAL | Age: 59
End: 2024-11-18
Payer: MEDICAID

## 2024-11-18 NOTE — OUTREACH NOTE
Medical Week 1 Survey      Flowsheet Row Responses   [facility] patient discharged from? [facility]   Does the patient have one of the following disease processes/diagnoses(primary or secondary)? Other   Week 1 attempt successful? Yes   Call start time 1215   Call end time 1220   Discharge diagnosis Acute pancreatitis   Meds reviewed with patient/caregiver? Yes   Is the patient having any side effects they believe may be caused by any medication additions or changes? No   Does the patient have all medications ordered at discharge? Yes   Is the patient taking all medications as directed (includes completed medication regime)? Yes   Does the patient have a primary care provider?  Yes   Does the patient have an appointment with their PCP within 7 days of discharge? Yes   Has the patient kept scheduled appointments due by today? No   What is preventing the patient from keeping their appointments? --  [did not know had appt today, will reschedule]   Nursing Interventions Advised to reschedule appointment   Has home health visited the patient within 72 hours of discharge? N/A   Psychosocial issues? No   Did the patient receive a copy of their discharge instructions? Yes   Nursing interventions Reviewed instructions with patient   What is the patient's perception of their health status since discharge? Improving   Is the patient/caregiver able to teach back signs and symptoms related to disease process for when to call PCP? Yes   Is the patient/caregiver able to teach back signs and symptoms related to disease process for when to call 911? Yes   Is the patient/caregiver able to teach back the hierarchy of who to call/visit for symptoms/problems? PCP, Specialist, Home health nurse, Urgent Care, ED, 911 Yes   If the patient is a current smoker, are they able to teach back resources for cessation? --  [is gradually cutting back]   Additional teach back comments eating semi-solid food and tolerating well   Week 1 call  completed? Yes   Call end time 1220            Shayla ARMENDARIZ - Registered Nurse

## 2024-12-11 ENCOUNTER — HOSPITAL ENCOUNTER (EMERGENCY)
Facility: HOSPITAL | Age: 59
Discharge: HOME | End: 2024-12-11
Payer: MEDICAID

## 2024-12-11 VITALS
HEIGHT: 73 IN | SYSTOLIC BLOOD PRESSURE: 151 MMHG | TEMPERATURE: 97.3 F | OXYGEN SATURATION: 98 % | BODY MASS INDEX: 25.84 KG/M2 | WEIGHT: 195 LBS | DIASTOLIC BLOOD PRESSURE: 89 MMHG | HEART RATE: 89 BPM | RESPIRATION RATE: 16 BRPM

## 2024-12-11 DIAGNOSIS — S05.01XA ABRASION OF RIGHT CORNEA, INITIAL ENCOUNTER: Primary | ICD-10-CM

## 2024-12-11 PROCEDURE — 99283 EMERGENCY DEPT VISIT LOW MDM: CPT

## 2024-12-11 RX ORDER — POLYMYXIN B SULFATE AND TRIMETHOPRIM 1; 10000 MG/ML; [USP'U]/ML
1 SOLUTION OPHTHALMIC EVERY 4 HOURS
Qty: 10 ML | Refills: 0 | Status: SHIPPED | OUTPATIENT
Start: 2024-12-11

## 2024-12-11 RX ORDER — TETRACAINE HYDROCHLORIDE 5 MG/ML
2 SOLUTION OPHTHALMIC ONCE
Status: COMPLETED | OUTPATIENT
Start: 2024-12-11 | End: 2024-12-11

## 2024-12-11 RX ORDER — ERYTHROMYCIN 5 MG/G
OINTMENT OPHTHALMIC
Qty: 3.5 G | Refills: 0 | Status: SHIPPED | OUTPATIENT
Start: 2024-12-11

## 2024-12-11 RX ORDER — FLUORESCEIN SODIUM AND BENOXINATE HYDROCHLORIDE 2.6; 4.4 MG/ML; MG/ML
2 SOLUTION/ DROPS OPHTHALMIC ONCE
Status: COMPLETED | OUTPATIENT
Start: 2024-12-11 | End: 2024-12-11

## 2024-12-11 RX ADMIN — TETRACAINE HYDROCHLORIDE 2 DROP: 5 SOLUTION OPHTHALMIC at 17:25

## 2024-12-11 RX ADMIN — FLUORESCEIN SODIUM AND BENOXINATE HYDROCHLORIDE 2 DROP: 2.6; 4.4 SOLUTION/ DROPS OPHTHALMIC at 17:25

## 2024-12-11 NOTE — ED PROVIDER NOTES
Subjective   History of Present Illness  59-year-old male with past medical history of anxiety, sleep apnea, HIV, and AIDS presents to the emergency room with right eye pain.  Patient states he had a bilateral radial keratotomy performed on Friday at [facility] in [place] and went today to have his contacts removed.  Patient states while driving home his right eye became very irritated and has been tearing and feeling like something is in it.  He states that it is very sensitive to light and he cannot seem to get any relief.  He denies any rubbing of the eye since contact was removed.  He does state that bilateral eyes are blurry and states that he is seeing triple.  Denies any other complaints or concerns at this time.    History provided by:  Patient   used: No        Review of Systems   Constitutional: Negative.  Negative for fever.   HENT: Negative.     Eyes:  Positive for pain and redness.   Respiratory: Negative.     Cardiovascular: Negative.  Negative for chest pain.   Gastrointestinal: Negative.  Negative for abdominal pain.   Endocrine: Negative.    Genitourinary: Negative.  Negative for dysuria.   Skin: Negative.    Neurological: Negative.    Psychiatric/Behavioral: Negative.     All other systems reviewed and are negative.      Past Medical History:   Diagnosis Date    AIDS (acquired immune deficiency syndrome)     Anxiety     HIV (human immunodeficiency virus infection)     Sleep apnea     no c-pap       Allergies   Allergen Reactions    Codeine Shortness Of Breath       Past Surgical History:   Procedure Laterality Date    ANTERIOR CERVICAL DISCECTOMY W/ FUSION Left 11/3/2021    Procedure: CERVICAL DISCECTOMY ANTERIOR WITH FUSION C6-7 LEFT;  Surgeon: Roldan Murphy MD;  Location: [facility];  Service: Neurosurgery;  Laterality: Left;    APPENDECTOMY      COLONOSCOPY  2020    EYE SURGERY Bilateral     lasik    GALLBLADDER SURGERY         Family History   Problem Relation Age of  Onset    Heart disease Mother     Diabetes Sister     Hypertension Sister        Social History     Socioeconomic History    Marital status: Single   Tobacco Use    Smoking status: Every Day     Current packs/day: 0.50     Average packs/day: 0.5 packs/day for 40.0 years (20.0 ttl pk-yrs)     Types: Cigarettes    Smokeless tobacco: Never   Vaping Use    Vaping status: Never Used   Substance and Sexual Activity    Alcohol use: Yes     Comment: occassional    Drug use: Yes     Types: Marijuana     Comment: avg deepak every 3 weeks, last use over 1 month ago    Sexual activity: Defer           Objective   Physical Exam  Vitals and nursing note reviewed.   Constitutional:       General: He is not in acute distress.     Appearance: He is well-developed. He is not diaphoretic.   HENT:      Head: Normocephalic and atraumatic.      Right Ear: External ear normal.      Left Ear: External ear normal.      Nose: Nose normal.   Eyes:      Extraocular Movements: Extraocular movements intact.      Conjunctiva/sclera: Conjunctivae normal.      Right eye: Right conjunctiva is injected.      Pupils: Pupils are equal, round, and reactive to light.      Right eye: Corneal abrasion and fluorescein uptake present. Ronnie exam negative.      Left eye: Ronnie exam negative.  Neck:      Vascular: No JVD.      Trachea: No tracheal deviation.   Cardiovascular:      Rate and Rhythm: Normal rate and regular rhythm.      Heart sounds: Normal heart sounds. No murmur heard.  Pulmonary:      Effort: Pulmonary effort is normal. No respiratory distress.      Breath sounds: Normal breath sounds. No wheezing.   Abdominal:      General: Bowel sounds are normal.      Palpations: Abdomen is soft.      Tenderness: There is no abdominal tenderness.   Musculoskeletal:         General: No deformity. Normal range of motion.      Cervical back: Normal range of motion and neck supple.   Skin:     General: Skin is warm and dry.      Coloration: Skin is not pale.       Findings: No erythema or rash.   Neurological:      Mental Status: He is alert and oriented to person, place, and time.      Cranial Nerves: No cranial nerve deficit.   Psychiatric:         Behavior: Behavior normal.         Thought Content: Thought content normal.         Procedures           ED Course  ED Course as of 12/11/24 2204   Wed Dec 11, 2024   1821 Spoke with Dr. Storm, ophthalmologist who recommends antibiotic eyedrops and ointment and states he will see patient in his clinic tomorrow morning.    Patient also seen and evaluated by ED attending, Dr. Mccann. [TK]      ED Course User Index  [TK] Ave Hagen PA-C                                                       Medical Decision Making  59-year-old male with past medical history of anxiety, sleep apnea, HIV, and AIDS presents to the emergency room with right eye pain.  Patient states he had a bilateral radial keratotomy performed on Friday at [facility] in [place] and went today to have his contacts removed.  Patient states while driving home his right eye became very irritated and has been tearing and feeling like something is in it.  He states that it is very sensitive to light and he cannot seem to get any relief.  He denies any rubbing of the eye since contact was removed.  He does state that bilateral eyes are blurry and states that he is seeing triple.  Denies any other complaints or concerns at this time.      Problems Addressed:  Abrasion of right cornea, initial encounter: complicated acute illness or injury    Amount and/or Complexity of Data Reviewed  Discussion of management or test interpretation with external provider(s): Emeterio (opthalmoogist)  Ramy (ED attending)    Risk  Prescription drug management.        Final diagnoses:   Abrasion of right cornea, initial encounter       ED Disposition  ED Disposition       ED Disposition   Discharge    Condition   Stable    Comment   --               Sreedhar Storm,  MD  [address] [facility] [address]  [phone]    Call on 12/12/2024  call office at 8am tomorrow morning per Dr. Storm         Medication List        New Prescriptions      erythromycin 5 MG/GM ophthalmic ointment  Commonly known as: ROMYCIN  Administer  to the right eye Every 4 (Four) Hours While Awake.     trimethoprim-polymyxin b 46412-0.1 UNIT/ML-% ophthalmic solution  Commonly known as: POLYTRIM  Administer 1 drop to the right eye Every 4 (Four) Hours.               Where to Get Your Medications        These medications were sent to [place], [place] - [address] [place] [address] - [phone]  - [phone] [address] [address]      Phone: [phone]   erythromycin 5 MG/GM ophthalmic ointment  trimethoprim-polymyxin b 63511-0.1 UNIT/ML-% ophthalmic solution            Ave Hagen PA-C  12/11/24 220

## 2024-12-11 NOTE — DISCHARGE INSTRUCTIONS
Take a list of all medications, copy of any eye notes you may have from [facility], insurance, and any co-pays if you have any. Return to the ER for new or worsening symptoms.

## 2025-03-03 ENCOUNTER — DOCUMENTATION (OUTPATIENT)
Dept: SURGERY | Facility: CLINIC | Age: 60
End: 2025-03-03
Payer: MEDICAID

## 2025-03-03 ENCOUNTER — OFFICE VISIT (OUTPATIENT)
Dept: SURGERY | Facility: CLINIC | Age: 60
End: 2025-03-03
Payer: MEDICAID

## 2025-03-03 VITALS — WEIGHT: 200 LBS | HEIGHT: 73 IN | BODY MASS INDEX: 26.51 KG/M2

## 2025-03-03 DIAGNOSIS — L72.9 SKIN CYST: Primary | ICD-10-CM

## 2025-03-03 NOTE — PROGRESS NOTES
Subjective   Jag Bueno is a 59 y.o. male.     Chief Complaint: cyst    History of Present Illness He is a 60 yo who has had a cyst on the back that has been growing and and getting uncomfortable.    The following portions of the patient's history were reviewed and updated as appropriate: current medications, past family history, past medical history, past social history, past surgical history and problem list.    Review of Systems    Objective   Physical Exam he has a cyst on the left mid back. It was excised with lidocaine and nylon sutures. 1.5 cm    Past Medical History:   Diagnosis Date    AIDS (acquired immune deficiency syndrome)     Anxiety     HIV (human immunodeficiency virus infection)     Sleep apnea     no c-pap       Family History   Problem Relation Age of Onset    Heart disease Mother     Diabetes Sister     Hypertension Sister        Social History     Tobacco Use    Smoking status: Every Day     Current packs/day: 0.50     Average packs/day: 0.5 packs/day for 40.0 years (20.0 ttl pk-yrs)     Types: Cigarettes    Smokeless tobacco: Never   Vaping Use    Vaping status: Never Used   Substance Use Topics    Alcohol use: Yes     Comment: occassional    Drug use: Yes     Types: Marijuana     Comment: avg deepak every 3 weeks, last use over 1 month ago       Past Surgical History:   Procedure Laterality Date    ANTERIOR CERVICAL DISCECTOMY W/ FUSION Left 11/3/2021    Procedure: CERVICAL DISCECTOMY ANTERIOR WITH FUSION C6-7 LEFT;  Surgeon: Roldan Murphy MD;  Location: [facility];  Service: Neurosurgery;  Laterality: Left;    APPENDECTOMY      COLONOSCOPY  2020    EYE SURGERY Bilateral     lasik    GALLBLADDER SURGERY         Current Outpatient Medications   Medication Instructions    buPROPion XL (WELLBUTRIN XL) 300 mg, Oral, Daily    colesevelam (WELCHOL) 3.75 g, Oral, Daily    Darunavir-Cobicistat (PREZCOBIX) 800-150 MG per tablet 1 tablet, Oral, Daily    dolutegravir (TIVICAY) 50 mg, Oral,  Daily    dutasteride (AVODART) 0.5 mg, Oral, Daily    erythromycin (ROMYCIN) 5 MG/GM ophthalmic ointment Right Eye, Every 4 Hours While Awake    famotidine (PEPCID) 20 mg, Oral, Nightly PRN    Fostemsavir Tromethamine  mg, Oral, 2 Times Daily    LORazepam (ATIVAN) 1 mg, Oral, Every 8 Hours PRN    Pifeltro 100 mg, Oral, Daily    Rexulti 1 mg, Oral, Daily    Testosterone 20.25 MG/1.25GM (1.62%) gel 1 Application, Transdermal, Daily, Prior to [facility] Admission, Patient was on: 2 pumps daily      Testosterone Cypionate 200 mg, Injection, Every 14 Days    triamcinolone (KENALOG) 0.1 % cream 1 Application, Topical, 2 Times Daily, Prior to [facility] Admission, Patient was on: apply to rash on chest    trimethoprim-polymyxin b (POLYTRIM) 12385-1.1 UNIT/ML-% ophthalmic solution 1 drop, Right Eye, Every 4 Hours         Assessment & Plan   Diagnoses and all orders for this visit:    1. Skin cyst (Primary)    Return 1 wk             This document has been electronically signed by Arun Briceño MD   March 3, 2025 11:02 EST

## 2025-03-10 ENCOUNTER — OFFICE VISIT (OUTPATIENT)
Dept: SURGERY | Facility: CLINIC | Age: 60
End: 2025-03-10
Payer: MEDICAID

## 2025-03-10 VITALS — BODY MASS INDEX: 26.51 KG/M2 | WEIGHT: 200 LBS | HEIGHT: 73 IN

## 2025-03-10 DIAGNOSIS — R22.1 NECK MASS: ICD-10-CM

## 2025-03-10 DIAGNOSIS — L72.9 SKIN CYST: Primary | ICD-10-CM

## 2025-03-10 PROCEDURE — 99024 POSTOP FOLLOW-UP VISIT: CPT | Performed by: SURGERY

## 2025-03-10 PROCEDURE — 1160F RVW MEDS BY RX/DR IN RCRD: CPT | Performed by: SURGERY

## 2025-03-10 PROCEDURE — 1159F MED LIST DOCD IN RCRD: CPT | Performed by: SURGERY

## 2025-03-10 NOTE — PROGRESS NOTES
Subjective   Jag Bueno is a 59 y.o. male.     Chief Complaint: post cyst excision    History of Present Illness He is a 60 yo who had a cyst on the back excised last week.     The following portions of the patient's history were reviewed and updated as appropriate: current medications, past family history, past medical history, past social history, past surgical history and problem list.    Review of Systems    Objective   Physical Exam wound looks ok, sutures removed.    Past Medical History:   Diagnosis Date    AIDS (acquired immune deficiency syndrome)     Anxiety     HIV (human immunodeficiency virus infection)     Sleep apnea     no c-pap       Family History   Problem Relation Age of Onset    Heart disease Mother     Diabetes Sister     Hypertension Sister        Social History     Tobacco Use    Smoking status: Every Day     Current packs/day: 0.50     Average packs/day: 0.5 packs/day for 40.0 years (20.0 ttl pk-yrs)     Types: Cigarettes     Passive exposure: Current    Smokeless tobacco: Never   Vaping Use    Vaping status: Never Used   Substance Use Topics    Alcohol use: Yes     Comment: occassional    Drug use: Yes     Types: Marijuana     Comment: avg deepak every 3 weeks, last use over 1 month ago       Past Surgical History:   Procedure Laterality Date    ANTERIOR CERVICAL DISCECTOMY W/ FUSION Left 11/3/2021    Procedure: CERVICAL DISCECTOMY ANTERIOR WITH FUSION C6-7 LEFT;  Surgeon: Roldan Murphy MD;  Location: [facility];  Service: Neurosurgery;  Laterality: Left;    APPENDECTOMY      COLONOSCOPY  2020    EYE SURGERY Bilateral     lasik    GALLBLADDER SURGERY         Current Outpatient Medications   Medication Instructions    buPROPion XL (WELLBUTRIN XL) 300 mg, Daily    colesevelam (WELCHOL) 3.75 g, Daily    Darunavir-Cobicistat (PREZCOBIX) 800-150 MG per tablet 1 tablet, Daily    dolutegravir (TIVICAY) 50 mg, Daily    dutasteride (AVODART) 0.5 mg, Daily    erythromycin (ROMYCIN) 5 MG/GM  ophthalmic ointment Right Eye, Every 4 Hours While Awake    famotidine (PEPCID) 20 mg, Nightly PRN    Fostemsavir Tromethamine  mg, 2 Times Daily    LORazepam (ATIVAN) 1 mg, Every 8 Hours PRN    Pifeltro 100 mg, Daily    Rexulti 1 mg, Daily    Testosterone 20.25 MG/1.25GM (1.62%) gel 1 Application, Daily    Testosterone Cypionate 200 mg, Every 14 Days    triamcinolone (KENALOG) 0.1 % cream 1 Application, 2 Times Daily    trimethoprim-polymyxin b (POLYTRIM) 30520-9.1 UNIT/ML-% ophthalmic solution 1 drop, Right Eye, Every 4 Hours         Assessment & Plan   Diagnoses and all orders for this visit:    1. Skin cyst (Primary)    Return prn             This document has been electronically signed by Arun Briceño MD   March 10, 2025 09:59 EDT

## (undated) DEVICE — LIMB HOLDER, WRIST/ANKLE: Brand: DEROYAL

## (undated) DEVICE — GLOW 'N TELL 30CM TAPE (20 STRIPS): Brand: VASCUTAPE RADIOPAQUE TAPE

## (undated) DEVICE — SUT SILK 2/0 TIES 18IN A185H

## (undated) DEVICE — NEEDLE, QUINCKE, 20GX3.5": Brand: MEDLINE

## (undated) DEVICE — SPNG GZ WOVN 4X4IN 12PLY 10/BX STRL

## (undated) DEVICE — DRP MICROSCOPE 4 BINOCULAR CV 54X150IN

## (undated) DEVICE — 3M™ STERI-DRAPE™ INSTRUMENT POUCH 1018: Brand: STERI-DRAPE™

## (undated) DEVICE — GLV SURG PREMIERPRO MIC LTX PF SZ8 BRN

## (undated) DEVICE — APPL CHLORAPREP TINTED 26ML TEAL

## (undated) DEVICE — C-ARM: Brand: UNBRANDED

## (undated) DEVICE — ADHS LIQ MASTISOL 2/3ML

## (undated) DEVICE — DISPOSABLE BIPOLAR FORCEPS 7 3/4" (19.7CM) SCOVILLE BAYONET, INSULATED, 1.5MM TIP AND 12 FT. (3.6M) CABLE: Brand: KIRWAN

## (undated) DEVICE — PATIENT RETURN ELECTRODE, SINGLE-USE, CONTACT QUALITY MONITORING, ADULT, WITH 9FT CORD, FOR PATIENTS WEIGING OVER 33LBS. (15KG): Brand: MEGADYNE

## (undated) DEVICE — PENCL ROCKRSWCH MEGADYNE W/HOLSTR 10FT SS

## (undated) DEVICE — Device

## (undated) DEVICE — STRAP POSTN KN/BDY FM 5X72IN DISP

## (undated) DEVICE — SKYLINE ANTERIOR CERVICAL PLATE SYSTEM DRILL 2.2 X 12MM: Brand: SKYLINE

## (undated) DEVICE — PK NEURO DISC 10

## (undated) DEVICE — ELECTRD BLD EZ CLN MOD XLNG 2.75IN

## (undated) DEVICE — KITTNER SPONGE: Brand: DEROYAL

## (undated) DEVICE — TOOL 14MH30 LEGEND 14CM 3MM: Brand: MIDAS REX ™

## (undated) DEVICE — GLV SURG BIOGEL LTX PF 8

## (undated) DEVICE — 3M™ STERI-STRIP™ REINFORCED ADHESIVE SKIN CLOSURES, R1547, 1/2 IN X 4 IN (12 MM X 100 MM), 6 STRIPS/ENVELOPE: Brand: 3M™ STERI-STRIP™

## (undated) DEVICE — NDL HYPO ECLPS SFTY 25G 1 1/2IN

## (undated) DEVICE — SYR CONTRL LUERLOK 10CC

## (undated) DEVICE — SOL ISO/ALC RUB 70PCT 4OZ

## (undated) DEVICE — BANDAGE,GAUZE,BULKEE II,4.5"X4.1YD,STRL: Brand: MEDLINE

## (undated) DEVICE — DRSNG WND BORDR/ADHS NONADHR/GZ LF 4X4IN STRL

## (undated) DEVICE — SCRB SURG BACTOSHIELD CHG 4PCT 4OZ

## (undated) DEVICE — ANTIBACTERIAL UNDYED BRAIDED (POLYGLACTIN 910), SYNTHETIC ABSORBABLE SUTURE: Brand: COATED VICRYL

## (undated) DEVICE — BLANKT WARM LOWR/BDY 100X120CM

## (undated) DEVICE — SUCTION CANISTER, 2500CC, RIGID: Brand: DEROYAL